# Patient Record
Sex: FEMALE | Race: WHITE | NOT HISPANIC OR LATINO | Employment: OTHER | URBAN - METROPOLITAN AREA
[De-identification: names, ages, dates, MRNs, and addresses within clinical notes are randomized per-mention and may not be internally consistent; named-entity substitution may affect disease eponyms.]

---

## 2024-11-08 ENCOUNTER — APPOINTMENT (OUTPATIENT)
Dept: RADIOLOGY | Facility: HOSPITAL | Age: 79
DRG: 522 | End: 2024-11-08
Payer: MEDICARE

## 2024-11-08 ENCOUNTER — HOSPITAL ENCOUNTER (INPATIENT)
Facility: HOSPITAL | Age: 79
End: 2024-11-08
Attending: STUDENT IN AN ORGANIZED HEALTH CARE EDUCATION/TRAINING PROGRAM | Admitting: STUDENT IN AN ORGANIZED HEALTH CARE EDUCATION/TRAINING PROGRAM
Payer: MEDICARE

## 2024-11-08 ENCOUNTER — APPOINTMENT (OUTPATIENT)
Dept: CARDIOLOGY | Facility: HOSPITAL | Age: 79
DRG: 522 | End: 2024-11-08
Payer: MEDICARE

## 2024-11-08 DIAGNOSIS — S72.002A LEFT DISPLACED FEMORAL NECK FRACTURE: ICD-10-CM

## 2024-11-08 DIAGNOSIS — R55 SYNCOPE, UNSPECIFIED SYNCOPE TYPE: Primary | ICD-10-CM
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
## 2024-11-08 DIAGNOSIS — S72.002A CLOSED FRACTURE OF LEFT HIP, INITIAL ENCOUNTER: ICD-10-CM

## 2024-11-08 DIAGNOSIS — Z01.818 ENCOUNTER FOR OTHER PREPROCEDURAL EXAMINATION: ICD-10-CM

## 2024-11-08 LAB
ANION GAP SERPL CALC-SCNC: 12 MMOL/L (ref 10–20)
APTT PPP: 27 SECONDS (ref 27–38)
BASOPHILS # BLD AUTO: 0.03 X10*3/UL (ref 0–0.1)
BASOPHILS NFR BLD AUTO: 0.4 %
BUN SERPL-MCNC: 10 MG/DL (ref 6–23)
CALCIUM SERPL-MCNC: 9.1 MG/DL (ref 8.6–10.3)
CARDIAC TROPONIN I PNL SERPL HS: 4 NG/L (ref 0–13)
CHLORIDE SERPL-SCNC: 103 MMOL/L (ref 98–107)
CO2 SERPL-SCNC: 27 MMOL/L (ref 21–32)
CREAT SERPL-MCNC: 0.88 MG/DL (ref 0.5–1.05)
EGFRCR SERPLBLD CKD-EPI 2021: 67 ML/MIN/1.73M*2
EOSINOPHIL # BLD AUTO: 0.09 X10*3/UL (ref 0–0.4)
EOSINOPHIL NFR BLD AUTO: 1.1 %
ERYTHROCYTE [DISTWIDTH] IN BLOOD BY AUTOMATED COUNT: 12.6 % (ref 11.5–14.5)
GLUCOSE SERPL-MCNC: 194 MG/DL (ref 74–99)
HCT VFR BLD AUTO: 43.1 % (ref 36–46)
HGB BLD-MCNC: 14.3 G/DL (ref 12–16)
IMM GRANULOCYTES # BLD AUTO: 0.07 X10*3/UL (ref 0–0.5)
IMM GRANULOCYTES NFR BLD AUTO: 0.8 % (ref 0–0.9)
INR PPP: 1.1 (ref 0.9–1.1)
LYMPHOCYTES # BLD AUTO: 0.95 X10*3/UL (ref 0.8–3)
LYMPHOCYTES NFR BLD AUTO: 11.2 %
MCH RBC QN AUTO: 30 PG (ref 26–34)
MCHC RBC AUTO-ENTMCNC: 33.2 G/DL (ref 32–36)
MCV RBC AUTO: 91 FL (ref 80–100)
MONOCYTES # BLD AUTO: 0.83 X10*3/UL (ref 0.05–0.8)
MONOCYTES NFR BLD AUTO: 9.8 %
NEUTROPHILS # BLD AUTO: 6.53 X10*3/UL (ref 1.6–5.5)
NEUTROPHILS NFR BLD AUTO: 76.7 %
NRBC BLD-RTO: 0 /100 WBCS (ref 0–0)
PLATELET # BLD AUTO: 239 X10*3/UL (ref 150–450)
POTASSIUM SERPL-SCNC: 3.8 MMOL/L (ref 3.5–5.3)
PROTHROMBIN TIME: 12.4 SECONDS (ref 9.8–12.8)
RBC # BLD AUTO: 4.76 X10*6/UL (ref 4–5.2)
SODIUM SERPL-SCNC: 138 MMOL/L (ref 136–145)
WBC # BLD AUTO: 8.5 X10*3/UL (ref 4.4–11.3)

## 2024-11-08 PROCEDURE — 99223 1ST HOSP IP/OBS HIGH 75: CPT | Performed by: NURSE PRACTITIONER

## 2024-11-08 PROCEDURE — 82374 ASSAY BLOOD CARBON DIOXIDE: CPT | Performed by: STUDENT IN AN ORGANIZED HEALTH CARE EDUCATION/TRAINING PROGRAM

## 2024-11-08 PROCEDURE — 2500000004 HC RX 250 GENERAL PHARMACY W/ HCPCS (ALT 636 FOR OP/ED): Performed by: STUDENT IN AN ORGANIZED HEALTH CARE EDUCATION/TRAINING PROGRAM

## 2024-11-08 PROCEDURE — 1200000002 HC GENERAL ROOM WITH TELEMETRY DAILY

## 2024-11-08 PROCEDURE — 96374 THER/PROPH/DIAG INJ IV PUSH: CPT

## 2024-11-08 PROCEDURE — 70450 CT HEAD/BRAIN W/O DYE: CPT

## 2024-11-08 PROCEDURE — 70450 CT HEAD/BRAIN W/O DYE: CPT | Performed by: STUDENT IN AN ORGANIZED HEALTH CARE EDUCATION/TRAINING PROGRAM

## 2024-11-08 PROCEDURE — 76377 3D RENDER W/INTRP POSTPROCES: CPT

## 2024-11-08 PROCEDURE — 80048 BASIC METABOLIC PNL TOTAL CA: CPT | Performed by: STUDENT IN AN ORGANIZED HEALTH CARE EDUCATION/TRAINING PROGRAM

## 2024-11-08 PROCEDURE — 71045 X-RAY EXAM CHEST 1 VIEW: CPT

## 2024-11-08 PROCEDURE — 72125 CT NECK SPINE W/O DYE: CPT | Performed by: STUDENT IN AN ORGANIZED HEALTH CARE EDUCATION/TRAINING PROGRAM

## 2024-11-08 PROCEDURE — 93005 ELECTROCARDIOGRAM TRACING: CPT

## 2024-11-08 PROCEDURE — 72125 CT NECK SPINE W/O DYE: CPT

## 2024-11-08 PROCEDURE — 73700 CT LOWER EXTREMITY W/O DYE: CPT | Mod: LT

## 2024-11-08 PROCEDURE — 71045 X-RAY EXAM CHEST 1 VIEW: CPT | Performed by: RADIOLOGY

## 2024-11-08 PROCEDURE — 36415 COLL VENOUS BLD VENIPUNCTURE: CPT | Performed by: STUDENT IN AN ORGANIZED HEALTH CARE EDUCATION/TRAINING PROGRAM

## 2024-11-08 PROCEDURE — 85610 PROTHROMBIN TIME: CPT | Performed by: STUDENT IN AN ORGANIZED HEALTH CARE EDUCATION/TRAINING PROGRAM

## 2024-11-08 PROCEDURE — 2500000004 HC RX 250 GENERAL PHARMACY W/ HCPCS (ALT 636 FOR OP/ED): Performed by: NURSE PRACTITIONER

## 2024-11-08 PROCEDURE — 73502 X-RAY EXAM HIP UNI 2-3 VIEWS: CPT | Mod: LT

## 2024-11-08 PROCEDURE — 85025 COMPLETE CBC W/AUTO DIFF WBC: CPT | Performed by: STUDENT IN AN ORGANIZED HEALTH CARE EDUCATION/TRAINING PROGRAM

## 2024-11-08 PROCEDURE — 85730 THROMBOPLASTIN TIME PARTIAL: CPT | Performed by: STUDENT IN AN ORGANIZED HEALTH CARE EDUCATION/TRAINING PROGRAM

## 2024-11-08 PROCEDURE — 99285 EMERGENCY DEPT VISIT HI MDM: CPT | Mod: 25

## 2024-11-08 PROCEDURE — 84484 ASSAY OF TROPONIN QUANT: CPT | Performed by: STUDENT IN AN ORGANIZED HEALTH CARE EDUCATION/TRAINING PROGRAM

## 2024-11-08 RX ORDER — OXYCODONE HYDROCHLORIDE 5 MG/1
5 TABLET ORAL EVERY 6 HOURS PRN
Status: ACTIVE | OUTPATIENT
Start: 2024-11-08

## 2024-11-08 RX ORDER — ACETAMINOPHEN 160 MG/5ML
650 SOLUTION ORAL EVERY 4 HOURS PRN
Status: ACTIVE | OUTPATIENT
Start: 2024-11-08

## 2024-11-08 RX ORDER — CYCLOBENZAPRINE HCL 10 MG
5 TABLET ORAL 3 TIMES DAILY PRN
Status: DISPENSED | OUTPATIENT
Start: 2024-11-08

## 2024-11-08 RX ORDER — MORPHINE SULFATE 4 MG/ML
4 INJECTION, SOLUTION INTRAMUSCULAR; INTRAVENOUS ONCE
Status: COMPLETED | OUTPATIENT
Start: 2024-11-08 | End: 2024-11-08

## 2024-11-08 RX ORDER — ACETAMINOPHEN 650 MG/1
650 SUPPOSITORY RECTAL EVERY 4 HOURS PRN
Status: ACTIVE | OUTPATIENT
Start: 2024-11-08

## 2024-11-08 RX ORDER — OXYCODONE HYDROCHLORIDE 5 MG/1
10 TABLET ORAL EVERY 6 HOURS PRN
Status: DISPENSED | OUTPATIENT
Start: 2024-11-08

## 2024-11-08 RX ORDER — MORPHINE SULFATE 2 MG/ML
2 INJECTION, SOLUTION INTRAMUSCULAR; INTRAVENOUS EVERY 4 HOURS PRN
Status: DISPENSED | OUTPATIENT
Start: 2024-11-08

## 2024-11-08 RX ORDER — PANTOPRAZOLE SODIUM 40 MG/10ML
40 INJECTION, POWDER, LYOPHILIZED, FOR SOLUTION INTRAVENOUS DAILY
Status: DISPENSED | OUTPATIENT
Start: 2024-11-09

## 2024-11-08 RX ORDER — ONDANSETRON HYDROCHLORIDE 2 MG/ML
4 INJECTION, SOLUTION INTRAVENOUS EVERY 8 HOURS PRN
Status: ACTIVE | OUTPATIENT
Start: 2024-11-08

## 2024-11-08 RX ORDER — DOCUSATE SODIUM 100 MG/1
100 CAPSULE, LIQUID FILLED ORAL 2 TIMES DAILY
Status: DISPENSED | OUTPATIENT
Start: 2024-11-08

## 2024-11-08 RX ORDER — ONDANSETRON 4 MG/1
4 TABLET, FILM COATED ORAL EVERY 8 HOURS PRN
Status: ACTIVE | OUTPATIENT
Start: 2024-11-08

## 2024-11-08 RX ORDER — TALC
3 POWDER (GRAM) TOPICAL NIGHTLY PRN
Status: ACTIVE | OUTPATIENT
Start: 2024-11-08

## 2024-11-08 RX ORDER — PANTOPRAZOLE SODIUM 40 MG/1
40 TABLET, DELAYED RELEASE ORAL DAILY
Status: DISPENSED | OUTPATIENT
Start: 2024-11-09

## 2024-11-08 RX ORDER — ACETAMINOPHEN 325 MG/1
650 TABLET ORAL EVERY 4 HOURS PRN
Status: ACTIVE | OUTPATIENT
Start: 2024-11-08

## 2024-11-08 ASSESSMENT — PAIN - FUNCTIONAL ASSESSMENT
PAIN_FUNCTIONAL_ASSESSMENT: 0-10
PAIN_FUNCTIONAL_ASSESSMENT: 0-10

## 2024-11-08 ASSESSMENT — LIFESTYLE VARIABLES
EVER HAD A DRINK FIRST THING IN THE MORNING TO STEADY YOUR NERVES TO GET RID OF A HANGOVER: NO
HAVE YOU EVER FELT YOU SHOULD CUT DOWN ON YOUR DRINKING: NO
TOTAL SCORE: 0
HAVE PEOPLE ANNOYED YOU BY CRITICIZING YOUR DRINKING: NO
EVER FELT BAD OR GUILTY ABOUT YOUR DRINKING: NO

## 2024-11-08 ASSESSMENT — PAIN DESCRIPTION - PAIN TYPE: TYPE: ACUTE PAIN

## 2024-11-08 ASSESSMENT — PAIN SCALES - GENERAL
PAINLEVEL_OUTOF10: 10 - WORST POSSIBLE PAIN
PAINLEVEL_OUTOF10: 10 - WORST POSSIBLE PAIN

## 2024-11-08 ASSESSMENT — PAIN DESCRIPTION - ORIENTATION: ORIENTATION: LEFT

## 2024-11-08 ASSESSMENT — PAIN DESCRIPTION - LOCATION: LOCATION: HIP

## 2024-11-08 NOTE — ED PROVIDER NOTES
HPI   Chief Complaint   Patient presents with    Fall     PT fell at [facility]. Did not head. No LOC no thinners. L hip painful, left leg shortened and rotated.         History provided by:  Patient  79-year-old female presenting for evaluation of left hip pain after fall.  Per EMS report, patient was at [facility] and had a syncopal event causing her to fall back.  No reported head trauma.  No blood thinners.  Patient had severe left hip pain with shortening and rotation.        Patient History   No past medical history on file.  No past surgical history on file.  No family history on file.  Social History     Tobacco Use    Smoking status: Not on file    Smokeless tobacco: Not on file   Substance Use Topics    Alcohol use: Not on file    Drug use: Not on file       Physical Exam   ED Triage Vitals [11/08/24 1731]   Temperature Heart Rate Respirations BP   35.7 °C (96.3 °F) 74 16 (!) 182/78      Pulse Ox Temp src Heart Rate Source Patient Position   99 % -- -- --      BP Location FiO2 (%)     -- --       Physical Exam  Vitals and nursing note reviewed.   Constitutional:       General: She is not in acute distress.  HENT:      Head: Atraumatic.      Mouth/Throat:      Mouth: Mucous membranes are moist.      Pharynx: Oropharynx is clear.   Eyes:      Conjunctiva/sclera: Conjunctivae normal.      Pupils: Pupils are equal, round, and reactive to light.   Cardiovascular:      Rate and Rhythm: Normal rate and regular rhythm.      Pulses: Normal pulses.   Pulmonary:      Effort: Pulmonary effort is normal. No respiratory distress.      Breath sounds: Normal breath sounds.   Abdominal:      General: There is no distension.      Palpations: Abdomen is soft.      Tenderness: There is no abdominal tenderness. There is no guarding or rebound.   Musculoskeletal:      Cervical back: Neck supple. No tenderness.      Comments: Left lower extremity slightly shortened and externally rotated.  Tenderness to palpation left lateral  hip.  Significant pain with any movement of the left hip.  Neurovascular intact.   Skin:     General: Skin is warm and dry.   Neurological:      Mental Status: She is alert and oriented to person, place, and time. Mental status is at baseline.      Cranial Nerves: No cranial nerve deficit.      Sensory: No sensory deficit.      Motor: No weakness.   Psychiatric:         Mood and Affect: Mood normal.         Behavior: Behavior normal.           ED Course & MDM   Diagnoses as of 11/08/24 2013   Syncope, unspecified syncope type   Fall, initial encounter   Left displaced femoral neck fracture                 No data recorded     Junior Coma Scale Score: 15 (11/08/24 1738 : Juancarlos Fuller RN)                   Labs Reviewed   CBC WITH AUTO DIFFERENTIAL - Abnormal       Result Value    WBC 8.5      nRBC 0.0      RBC 4.76      Hemoglobin 14.3      Hematocrit 43.1      MCV 91      MCH 30.0      MCHC 33.2      RDW 12.6      Platelets 239      Neutrophils % 76.7      Immature Granulocytes %, Automated 0.8      Lymphocytes % 11.2      Monocytes % 9.8      Eosinophils % 1.1      Basophils % 0.4      Neutrophils Absolute 6.53 (*)     Immature Granulocytes Absolute, Automated 0.07      Lymphocytes Absolute 0.95      Monocytes Absolute 0.83 (*)     Eosinophils Absolute 0.09      Basophils Absolute 0.03     BASIC METABOLIC PANEL - Abnormal    Glucose 194 (*)     Sodium 138      Potassium 3.8      Chloride 103      Bicarbonate 27      Anion Gap 12      Urea Nitrogen 10      Creatinine 0.88      eGFR 67      Calcium 9.1     PROTIME-INR - Normal    Protime 12.4      INR 1.1     APTT - Normal    aPTT 27      Narrative:     The APTT is no longer used for monitoring Unfractionated Heparin Therapy. For monitoring Heparin Therapy, use the Heparin Assay.   TROPONIN I, HIGH SENSITIVITY - Normal    Troponin I, High Sensitivity 4      Narrative:     Less than 99th percentile of normal range cutoff-  Female and children under 18 years old <14  ng/L; Male <21 ng/L: Negative  Repeat testing should be performed if clinically indicated.     Female and children under 18 years old 14-50 ng/L; Male 21-50 ng/L:  Consistent with possible cardiac damage and possible increased clinical   risk. Serial measurements may help to assess extent of myocardial damage.     >50 ng/L: Consistent with cardiac damage, increased clinical risk and  myocardial infarction. Serial measurements may help assess extent of   myocardial damage.      NOTE: Children less than 1 year old may have higher baseline troponin   levels and results should be interpreted in conjunction with the overall   clinical context.     NOTE: Troponin I testing is performed using a different   testing methodology at [facility] than at other   [facility]. Direct result comparisons should only   be made within the same method.     CT hip left wo IV contrast   Final Result   Displaced, comminuted and slightly impacted left subcapital femoral   fracture with anterior angulation of the fracture fragments.        MACRO:   None.        Signed by: Modesto Mak 11/8/2024 7:16 PM   Dictation workstation:   [id number]      CT 3D reconstruction   Final Result   Displaced, comminuted and slightly impacted left subcapital femoral   fracture with anterior angulation of the fracture fragments.        MACRO:   None.        Signed by: Modesto Mak 11/8/2024 7:16 PM   Dictation workstation:   [id number]      CT head wo IV contrast   Final Result   CT HEAD:        No evidence of hemorrhage, skull fracture, or other acute   intracranial trauma/abnormality.        CT C-SPINE:        No evidence of acute trauma to the cervical spine.        MACRO:   None        Signed by: Modesto Mak 11/8/2024 7:12 PM   Dictation workstation:   [id number]      CT cervical spine wo IV contrast   Final Result   CT HEAD:        No evidence of hemorrhage, skull fracture, or other acute   intracranial  trauma/abnormality.        CT C-SPINE:        No evidence of acute trauma to the cervical spine.        MACRO:   None        Signed by: Modesto Mak 11/8/2024 7:12 PM   Dictation workstation:   [id number]      XR hip left with pelvis when performed 2 or 3 views   Final Result   Addendum (preliminary) 1 of 1   Interpreted By:  Lucy Wilburn,    ADDENDUM:   Study:   Pelvis one view   Left hip, two views.        Signed by: Lucy Wilburn 11/8/2024 7:38 PM        -------- ORIGINAL REPORT --------   Dictation workstation:   [id number]      Final   1. Subcapital left femoral neck fracture.        MACRO:   None.        Signed by: Lucy Wilburn 11/8/2024 6:44 PM   Dictation workstation:   [id number]      XR chest 1 view   Final Result   1. No acute cardiopulmonary process.        MACRO:   None.        Signed by: Lucy Wilburn 11/8/2024 6:45 PM   Dictation workstation:   [id number]                Medical Decision Making  Amount and/or Complexity of Data Reviewed  ECG/medicine tests: ordered and independent interpretation performed. Decision-making details documented in ED Course.     Details: Twelve-lead ECG obtained at 1800 interpretationnormal sinus rhythm with a rate of 79, no acute ST elevation or depression.  No other acute ischemic changes.  QTc 424    79-year-old female presenting via EMS from [facility] after syncope with fall with concerns for left hip fracture.  Left lower extremity is neurovascularly intact and slightly shortened and externally rotated.  Patient unsure if she hit her head or not.  No blood thinner use.  Plan obtain CT imaging of the head and C-spine to rule out acute traumatic process.  Plain films obtained of the left hip to rule out fracture.  Patient with morphine IV for analgesia.  Basic lab work obtained as well as ECG and troponin given the reported syncope.    Patient's labs reviewed and are overall unremarkable.  Troponin within normal limits.  Normal H&H.  Normal  coags.  No significant electrolyte derangements.  Mild hyperglycemia.  CT head negative for intracranial hemorrhage.  No C-spine fractures on CT imaging of the C-spine.  C-collar was cleared.  Chest x-ray is clear without focal findings.  No pneumothorax.  Plain films of the left hip show left femoral neck fracture.  CT with 3D recons obtained for preoperative planning for orthopedic surgery.  Case discussed with orthopedic surgery on-call, Dr. Porter.  Plan for OR tomorrow.  N.p.o. after midnight.  Case discussed with hospitalist team for admission.    Procedure  Procedures     Bienvenido Powers MD  11/08/24 2013

## 2024-11-09 ENCOUNTER — APPOINTMENT (OUTPATIENT)
Dept: CARDIOLOGY | Facility: HOSPITAL | Age: 79
DRG: 522 | End: 2024-11-09
Payer: MEDICARE

## 2024-11-09 ENCOUNTER — APPOINTMENT (OUTPATIENT)
Dept: RADIOLOGY | Facility: HOSPITAL | Age: 79
DRG: 522 | End: 2024-11-09
Payer: MEDICARE

## 2024-11-09 ENCOUNTER — APPOINTMENT (OUTPATIENT)
Dept: CARDIOLOGY | Facility: HOSPITAL | Age: 79
End: 2024-11-09
Payer: MEDICARE

## 2024-11-09 ENCOUNTER — ANESTHESIA (OUTPATIENT)
Dept: OPERATING ROOM | Facility: HOSPITAL | Age: 79
DRG: 522 | End: 2024-11-09
Payer: MEDICARE

## 2024-11-09 ENCOUNTER — ANESTHESIA EVENT (OUTPATIENT)
Dept: OPERATING ROOM | Facility: HOSPITAL | Age: 79
DRG: 522 | End: 2024-11-09
Payer: MEDICARE

## 2024-11-09 PROBLEM — S72.002A LEFT DISPLACED FEMORAL NECK FRACTURE: Status: ACTIVE | Noted: 2024-11-08

## 2024-11-09 PROBLEM — I10 PRIMARY HYPERTENSION: Status: ACTIVE | Noted: 2024-11-09

## 2024-11-09 LAB
ALBUMIN SERPL BCP-MCNC: 4.1 G/DL (ref 3.4–5)
ALP SERPL-CCNC: 59 U/L (ref 33–136)
ALT SERPL W P-5'-P-CCNC: 9 U/L (ref 7–45)
ANION GAP SERPL CALC-SCNC: 12 MMOL/L (ref 10–20)
AORTIC VALVE MEAN GRADIENT: 4 MMHG
AORTIC VALVE PEAK VELOCITY: 1.34 M/S
AST SERPL W P-5'-P-CCNC: 17 U/L (ref 9–39)
ATRIAL RATE: 79 BPM
AV PEAK GRADIENT: 7 MMHG
AVA (PEAK VEL): 2.16 CM2
AVA (VTI): 2.41 CM2
BASOPHILS # BLD AUTO: 0.02 X10*3/UL (ref 0–0.1)
BASOPHILS NFR BLD AUTO: 0.2 %
BILIRUB SERPL-MCNC: 0.9 MG/DL (ref 0–1.2)
BUN SERPL-MCNC: 9 MG/DL (ref 6–23)
CALCIUM SERPL-MCNC: 8.9 MG/DL (ref 8.6–10.3)
CHLORIDE SERPL-SCNC: 103 MMOL/L (ref 98–107)
CO2 SERPL-SCNC: 26 MMOL/L (ref 21–32)
CREAT SERPL-MCNC: 0.62 MG/DL (ref 0.5–1.05)
EGFRCR SERPLBLD CKD-EPI 2021: >90 ML/MIN/1.73M*2
EJECTION FRACTION APICAL 4 CHAMBER: 61.4
EJECTION FRACTION: 60 %
EOSINOPHIL # BLD AUTO: 0.02 X10*3/UL (ref 0–0.4)
EOSINOPHIL NFR BLD AUTO: 0.2 %
ERYTHROCYTE [DISTWIDTH] IN BLOOD BY AUTOMATED COUNT: 12.7 % (ref 11.5–14.5)
GLUCOSE SERPL-MCNC: 137 MG/DL (ref 74–99)
HCT VFR BLD AUTO: 42.4 % (ref 36–46)
HGB BLD-MCNC: 14.1 G/DL (ref 12–16)
IMM GRANULOCYTES # BLD AUTO: 0.08 X10*3/UL (ref 0–0.5)
IMM GRANULOCYTES NFR BLD AUTO: 0.6 % (ref 0–0.9)
LACTATE SERPL-SCNC: 3.4 MMOL/L (ref 0.4–2)
LACTATE SERPL-SCNC: 5.3 MMOL/L (ref 0.4–2)
LEFT ATRIUM VOLUME AREA LENGTH INDEX BSA: 14.1 ML/M2
LEFT VENTRICLE INTERNAL DIMENSION DIASTOLE: 3.2 CM (ref 3.5–6)
LEFT VENTRICULAR OUTFLOW TRACT DIAMETER: 1.9 CM
LV EJECTION FRACTION BIPLANE: 58 %
LYMPHOCYTES # BLD AUTO: 1 X10*3/UL (ref 0.8–3)
LYMPHOCYTES NFR BLD AUTO: 7.9 %
MAGNESIUM SERPL-MCNC: 2.08 MG/DL (ref 1.6–2.4)
MCH RBC QN AUTO: 29.8 PG (ref 26–34)
MCHC RBC AUTO-ENTMCNC: 33.3 G/DL (ref 32–36)
MCV RBC AUTO: 90 FL (ref 80–100)
MITRAL VALVE E/A RATIO: 0.79
MONOCYTES # BLD AUTO: 1.29 X10*3/UL (ref 0.05–0.8)
MONOCYTES NFR BLD AUTO: 10.2 %
NEUTROPHILS # BLD AUTO: 10.26 X10*3/UL (ref 1.6–5.5)
NEUTROPHILS NFR BLD AUTO: 80.9 %
NRBC BLD-RTO: 0 /100 WBCS (ref 0–0)
P AXIS: 65 DEGREES
P OFFSET: 196 MS
P ONSET: 148 MS
PLATELET # BLD AUTO: 242 X10*3/UL (ref 150–450)
POTASSIUM SERPL-SCNC: 4 MMOL/L (ref 3.5–5.3)
PR INTERVAL: 136 MS
PROT SERPL-MCNC: 6.9 G/DL (ref 6.4–8.2)
Q ONSET: 216 MS
QRS COUNT: 13 BEATS
QRS DURATION: 68 MS
QT INTERVAL: 370 MS
QTC CALCULATION(BAZETT): 424 MS
QTC FREDERICIA: 405 MS
R AXIS: 51 DEGREES
RBC # BLD AUTO: 4.73 X10*6/UL (ref 4–5.2)
RIGHT VENTRICLE FREE WALL PEAK S': 13.1 CM/S
RIGHT VENTRICLE PEAK SYSTOLIC PRESSURE: 38.8 MMHG
SODIUM SERPL-SCNC: 137 MMOL/L (ref 136–145)
T AXIS: 56 DEGREES
T OFFSET: 401 MS
TRICUSPID ANNULAR PLANE SYSTOLIC EXCURSION: 1.6 CM
TSH SERPL-ACNC: 0.73 MIU/L (ref 0.44–3.98)
VENTRICULAR RATE: 79 BPM
WBC # BLD AUTO: 12.7 X10*3/UL (ref 4.4–11.3)

## 2024-11-09 PROCEDURE — 83735 ASSAY OF MAGNESIUM: CPT | Performed by: NURSE PRACTITIONER

## 2024-11-09 PROCEDURE — 99221 1ST HOSP IP/OBS SF/LOW 40: CPT | Performed by: ORTHOPAEDIC SURGERY

## 2024-11-09 PROCEDURE — 2720000007 HC OR 272 NO HCPCS: Performed by: ORTHOPAEDIC SURGERY

## 2024-11-09 PROCEDURE — 2500000004 HC RX 250 GENERAL PHARMACY W/ HCPCS (ALT 636 FOR OP/ED): Performed by: NURSE PRACTITIONER

## 2024-11-09 PROCEDURE — 2500000001 HC RX 250 WO HCPCS SELF ADMINISTERED DRUGS (ALT 637 FOR MEDICARE OP): Performed by: ORTHOPAEDIC SURGERY

## 2024-11-09 PROCEDURE — 83605 ASSAY OF LACTIC ACID: CPT | Performed by: STUDENT IN AN ORGANIZED HEALTH CARE EDUCATION/TRAINING PROGRAM

## 2024-11-09 PROCEDURE — 27236 TREAT THIGH FRACTURE: CPT | Performed by: PHYSICIAN ASSISTANT

## 2024-11-09 PROCEDURE — 0SRS0J9 REPLACEMENT OF LEFT HIP JOINT, FEMORAL SURFACE WITH SYNTHETIC SUBSTITUTE, CEMENTED, OPEN APPROACH: ICD-10-PCS | Performed by: ORTHOPAEDIC SURGERY

## 2024-11-09 PROCEDURE — C1776 JOINT DEVICE (IMPLANTABLE): HCPCS | Performed by: ORTHOPAEDIC SURGERY

## 2024-11-09 PROCEDURE — 36415 COLL VENOUS BLD VENIPUNCTURE: CPT | Performed by: STUDENT IN AN ORGANIZED HEALTH CARE EDUCATION/TRAINING PROGRAM

## 2024-11-09 PROCEDURE — 3700000001 HC GENERAL ANESTHESIA TIME - INITIAL BASE CHARGE: Performed by: ORTHOPAEDIC SURGERY

## 2024-11-09 PROCEDURE — 2500000004 HC RX 250 GENERAL PHARMACY W/ HCPCS (ALT 636 FOR OP/ED)

## 2024-11-09 PROCEDURE — 73501 X-RAY EXAM HIP UNI 1 VIEW: CPT | Mod: LEFT SIDE | Performed by: RADIOLOGY

## 2024-11-09 PROCEDURE — 99232 SBSQ HOSP IP/OBS MODERATE 35: CPT | Performed by: STUDENT IN AN ORGANIZED HEALTH CARE EDUCATION/TRAINING PROGRAM

## 2024-11-09 PROCEDURE — 2780000003 HC OR 278 NO HCPCS: Performed by: ORTHOPAEDIC SURGERY

## 2024-11-09 PROCEDURE — 2500000002 HC RX 250 W HCPCS SELF ADMINISTERED DRUGS (ALT 637 FOR MEDICARE OP, ALT 636 FOR OP/ED)

## 2024-11-09 PROCEDURE — 3600000005 HC OR TIME - INITIAL BASE CHARGE - PROCEDURE LEVEL FIVE: Performed by: ORTHOPAEDIC SURGERY

## 2024-11-09 PROCEDURE — A6213 FOAM DRG >16<=48 SQ IN W/BDR: HCPCS | Performed by: ORTHOPAEDIC SURGERY

## 2024-11-09 PROCEDURE — 2500000005 HC RX 250 GENERAL PHARMACY W/O HCPCS: Performed by: ANESTHESIOLOGY

## 2024-11-09 PROCEDURE — 2500000001 HC RX 250 WO HCPCS SELF ADMINISTERED DRUGS (ALT 637 FOR MEDICARE OP): Performed by: ANESTHESIOLOGY

## 2024-11-09 PROCEDURE — 2500000004 HC RX 250 GENERAL PHARMACY W/ HCPCS (ALT 636 FOR OP/ED): Performed by: ORTHOPAEDIC SURGERY

## 2024-11-09 PROCEDURE — 85025 COMPLETE CBC W/AUTO DIFF WBC: CPT | Performed by: NURSE PRACTITIONER

## 2024-11-09 PROCEDURE — 84443 ASSAY THYROID STIM HORMONE: CPT | Performed by: STUDENT IN AN ORGANIZED HEALTH CARE EDUCATION/TRAINING PROGRAM

## 2024-11-09 PROCEDURE — 93880 EXTRACRANIAL BILAT STUDY: CPT

## 2024-11-09 PROCEDURE — 1200000002 HC GENERAL ROOM WITH TELEMETRY DAILY

## 2024-11-09 PROCEDURE — 3600000010 HC OR TIME - EACH INCREMENTAL 1 MINUTE - PROCEDURE LEVEL FIVE: Performed by: ORTHOPAEDIC SURGERY

## 2024-11-09 PROCEDURE — 73501 X-RAY EXAM HIP UNI 1 VIEW: CPT | Mod: LT

## 2024-11-09 PROCEDURE — 99223 1ST HOSP IP/OBS HIGH 75: CPT | Performed by: INTERNAL MEDICINE

## 2024-11-09 PROCEDURE — 3700000002 HC GENERAL ANESTHESIA TIME - EACH INCREMENTAL 1 MINUTE: Performed by: ORTHOPAEDIC SURGERY

## 2024-11-09 PROCEDURE — 93880 EXTRACRANIAL BILAT STUDY: CPT | Performed by: RADIOLOGY

## 2024-11-09 PROCEDURE — 27236 TREAT THIGH FRACTURE: CPT | Performed by: ORTHOPAEDIC SURGERY

## 2024-11-09 PROCEDURE — 36415 COLL VENOUS BLD VENIPUNCTURE: CPT | Performed by: NURSE PRACTITIONER

## 2024-11-09 PROCEDURE — 7100000001 HC RECOVERY ROOM TIME - INITIAL BASE CHARGE: Performed by: ORTHOPAEDIC SURGERY

## 2024-11-09 PROCEDURE — 93306 TTE W/DOPPLER COMPLETE: CPT

## 2024-11-09 PROCEDURE — 80053 COMPREHEN METABOLIC PANEL: CPT | Performed by: NURSE PRACTITIONER

## 2024-11-09 PROCEDURE — 93306 TTE W/DOPPLER COMPLETE: CPT | Performed by: INTERNAL MEDICINE

## 2024-11-09 PROCEDURE — 2500000004 HC RX 250 GENERAL PHARMACY W/ HCPCS (ALT 636 FOR OP/ED): Mod: JZ

## 2024-11-09 PROCEDURE — 2500000004 HC RX 250 GENERAL PHARMACY W/ HCPCS (ALT 636 FOR OP/ED): Performed by: ANESTHESIOLOGY

## 2024-11-09 PROCEDURE — 2500000005 HC RX 250 GENERAL PHARMACY W/O HCPCS: Performed by: ORTHOPAEDIC SURGERY

## 2024-11-09 PROCEDURE — 7100000002 HC RECOVERY ROOM TIME - EACH INCREMENTAL 1 MINUTE: Performed by: ORTHOPAEDIC SURGERY

## 2024-11-09 DEVICE — HEAD COMPONENT
Type: IMPLANTABLE DEVICE | Site: HIP | Status: FUNCTIONAL
Brand: UNITRAX

## 2024-11-09 DEVICE — DISTAL SPACER
Type: IMPLANTABLE DEVICE | Site: HIP | Status: FUNCTIONAL
Brand: ACCOLADE

## 2024-11-09 DEVICE — SIMPLEX® HV IS A FAST-SETTING ACRYLIC RESIN FOR USE IN BONE SURGERY. MIXING THE TWO SEPARATE STERILE COMPONENTS PRODUCES A DUCTILE BONE CEMENT WHICH, AFTER HARDENING, FIXES THE IMPLANT AND TRANSFERS STRESSES PRODUCED DURING MOVEMENT EVENLY TO THE BONE. SIMPLEX® HV CEMENT POWDER ALSO CONTAINS INSOLUBLE ZIRCONIUM DIOXIDE AS AN X-RAY CONTRAST MEDIUM. SIMPLEX® HV DOES NOT EMIT A SIGNAL AND DOES NOT POSE A SAFETY RISK IN A MAGNETIC RESONANCE ENVIRONMENT.
Type: IMPLANTABLE DEVICE | Site: HIP | Status: FUNCTIONAL
Brand: SIMPLEX HV

## 2024-11-09 DEVICE — NECK ADJUSTMENT SLEEVE
Type: IMPLANTABLE DEVICE | Site: HIP | Status: FUNCTIONAL
Brand: UNITRAX

## 2024-11-09 DEVICE — 132 DEGREE CEMENTED HIP STEM
Type: IMPLANTABLE DEVICE | Site: HIP | Status: FUNCTIONAL
Brand: ACCOLADE

## 2024-11-09 RX ORDER — PHENYLEPHRINE HCL IN 0.9% NACL 1 MG/10 ML
SYRINGE (ML) INTRAVENOUS AS NEEDED
Status: DISCONTINUED | OUTPATIENT
Start: 2024-11-09 | End: 2024-11-09

## 2024-11-09 RX ORDER — LABETALOL HYDROCHLORIDE 5 MG/ML
INJECTION, SOLUTION INTRAVENOUS AS NEEDED
Status: DISCONTINUED | OUTPATIENT
Start: 2024-11-09 | End: 2024-11-09

## 2024-11-09 RX ORDER — LISINOPRIL 10 MG/1
1 TABLET ORAL
Status: ON HOLD | COMMUNITY
Start: 2024-05-30

## 2024-11-09 RX ORDER — ENOXAPARIN SODIUM 100 MG/ML
40 INJECTION SUBCUTANEOUS DAILY
Status: DISPENSED | OUTPATIENT
Start: 2024-11-10 | End: 2024-12-08

## 2024-11-09 RX ORDER — WATER 1 ML/ML
IRRIGANT IRRIGATION AS NEEDED
Status: DISCONTINUED | OUTPATIENT
Start: 2024-11-09 | End: 2024-11-09 | Stop reason: HOSPADM

## 2024-11-09 RX ORDER — LABETALOL HYDROCHLORIDE 5 MG/ML
5 INJECTION, SOLUTION INTRAVENOUS
Status: DISPENSED | OUTPATIENT
Start: 2024-11-09 | End: 2024-11-09

## 2024-11-09 RX ORDER — MEPERIDINE HYDROCHLORIDE 50 MG/ML
12.5 INJECTION INTRAMUSCULAR; INTRAVENOUS; SUBCUTANEOUS EVERY 10 MIN PRN
Status: ACTIVE | OUTPATIENT
Start: 2024-11-09

## 2024-11-09 RX ORDER — TRANEXAMIC ACID 650 MG/1
1950 TABLET ORAL ONCE
Status: COMPLETED | OUTPATIENT
Start: 2024-11-09 | End: 2024-11-09

## 2024-11-09 RX ORDER — ROPIVACAINE/EPI/CLONIDINE/KET 2.46-0.005
SYRINGE (ML) INJECTION AS NEEDED
Status: DISCONTINUED | OUTPATIENT
Start: 2024-11-09 | End: 2024-11-09 | Stop reason: HOSPADM

## 2024-11-09 RX ORDER — FENTANYL CITRATE 50 UG/ML
25 INJECTION, SOLUTION INTRAMUSCULAR; INTRAVENOUS EVERY 5 MIN PRN
Status: ACTIVE | OUTPATIENT
Start: 2024-11-09

## 2024-11-09 RX ORDER — CEFAZOLIN SODIUM 2 G/100ML
2 INJECTION, SOLUTION INTRAVENOUS EVERY 8 HOURS
Status: COMPLETED | OUTPATIENT
Start: 2024-11-09 | End: 2024-11-10

## 2024-11-09 RX ORDER — SODIUM CHLORIDE, SODIUM LACTATE, POTASSIUM CHLORIDE, CALCIUM CHLORIDE 600; 310; 30; 20 MG/100ML; MG/100ML; MG/100ML; MG/100ML
INJECTION, SOLUTION INTRAVENOUS CONTINUOUS PRN
Status: DISCONTINUED | OUTPATIENT
Start: 2024-11-09 | End: 2024-11-09

## 2024-11-09 RX ORDER — CEFAZOLIN SODIUM 2 G/50ML
2 SOLUTION INTRAVENOUS ONCE
Status: COMPLETED | OUTPATIENT
Start: 2024-11-09 | End: 2024-11-09

## 2024-11-09 RX ORDER — SUCCINYLCHOLINE CHLORIDE 100 MG/5ML
SYRINGE (ML) INTRAVENOUS AS NEEDED
Status: DISCONTINUED | OUTPATIENT
Start: 2024-11-09 | End: 2024-11-09

## 2024-11-09 RX ORDER — ENOXAPARIN SODIUM 100 MG/ML
40 INJECTION SUBCUTANEOUS
Status: DISCONTINUED | OUTPATIENT
Start: 2024-11-09 | End: 2024-11-09

## 2024-11-09 RX ORDER — HYDRALAZINE HYDROCHLORIDE 20 MG/ML
10 INJECTION INTRAMUSCULAR; INTRAVENOUS EVERY 6 HOURS PRN
Status: DISPENSED | OUTPATIENT
Start: 2024-11-09

## 2024-11-09 RX ORDER — PROPOFOL 10 MG/ML
INJECTION, EMULSION INTRAVENOUS AS NEEDED
Status: DISCONTINUED | OUTPATIENT
Start: 2024-11-09 | End: 2024-11-09

## 2024-11-09 RX ORDER — FENTANYL CITRATE 50 UG/ML
50 INJECTION, SOLUTION INTRAMUSCULAR; INTRAVENOUS EVERY 5 MIN PRN
Status: ACTIVE | OUTPATIENT
Start: 2024-11-09

## 2024-11-09 RX ORDER — ROCURONIUM BROMIDE 10 MG/ML
INJECTION, SOLUTION INTRAVENOUS AS NEEDED
Status: DISCONTINUED | OUTPATIENT
Start: 2024-11-09 | End: 2024-11-09

## 2024-11-09 RX ORDER — CEFAZOLIN SODIUM 2 G/100ML
2 INJECTION, SOLUTION INTRAVENOUS ONCE
Status: DISCONTINUED | OUTPATIENT
Start: 2024-11-09 | End: 2024-11-09

## 2024-11-09 RX ORDER — FENTANYL CITRATE 50 UG/ML
INJECTION, SOLUTION INTRAMUSCULAR; INTRAVENOUS AS NEEDED
Status: DISCONTINUED | OUTPATIENT
Start: 2024-11-09 | End: 2024-11-09

## 2024-11-09 RX ORDER — TRANEXAMIC ACID 650 MG/1
1950 TABLET ORAL ONCE
Status: COMPLETED | OUTPATIENT
Start: 2024-11-10 | End: 2024-11-10

## 2024-11-09 RX ORDER — OXYCODONE HYDROCHLORIDE 5 MG/1
5 TABLET ORAL ONCE
Status: COMPLETED | OUTPATIENT
Start: 2024-11-09 | End: 2024-11-09

## 2024-11-09 RX ORDER — SODIUM CHLORIDE, SODIUM LACTATE, POTASSIUM CHLORIDE, CALCIUM CHLORIDE 600; 310; 30; 20 MG/100ML; MG/100ML; MG/100ML; MG/100ML
100 INJECTION, SOLUTION INTRAVENOUS CONTINUOUS
Status: ACTIVE | OUTPATIENT
Start: 2024-11-09 | End: 2024-11-10

## 2024-11-09 RX ORDER — LISINOPRIL 10 MG/1
10 TABLET ORAL
Status: DISCONTINUED | OUTPATIENT
Start: 2024-11-09 | End: 2024-11-09

## 2024-11-09 SDOH — ECONOMIC STABILITY: FOOD INSECURITY: HOW HARD IS IT FOR YOU TO PAY FOR THE VERY BASICS LIKE FOOD, HOUSING, MEDICAL CARE, AND HEATING?: PATIENT DECLINED

## 2024-11-09 SDOH — SOCIAL STABILITY: SOCIAL INSECURITY: DO YOU FEEL ANYONE HAS EXPLOITED OR TAKEN ADVANTAGE OF YOU FINANCIALLY OR OF YOUR PERSONAL PROPERTY?: NO

## 2024-11-09 SDOH — SOCIAL STABILITY: SOCIAL INSECURITY: HAVE YOU HAD THOUGHTS OF HARMING ANYONE ELSE?: NO

## 2024-11-09 SDOH — SOCIAL STABILITY: SOCIAL INSECURITY
WITHIN THE LAST YEAR, HAVE YOU BEEN HUMILIATED OR EMOTIONALLY ABUSED IN OTHER WAYS BY YOUR PARTNER OR EX-PARTNER?: PATIENT DECLINED

## 2024-11-09 SDOH — SOCIAL STABILITY: SOCIAL INSECURITY: DO YOU FEEL UNSAFE GOING BACK TO THE PLACE WHERE YOU ARE LIVING?: NO

## 2024-11-09 SDOH — ECONOMIC STABILITY: HOUSING INSECURITY: IN THE PAST 12 MONTHS, HOW MANY TIMES HAVE YOU MOVED WHERE YOU WERE LIVING?: 0

## 2024-11-09 SDOH — SOCIAL STABILITY: SOCIAL INSECURITY
WITHIN THE LAST YEAR, HAVE YOU BEEN KICKED, HIT, SLAPPED, OR OTHERWISE PHYSICALLY HURT BY YOUR PARTNER OR EX-PARTNER?: PATIENT DECLINED

## 2024-11-09 SDOH — ECONOMIC STABILITY: FOOD INSECURITY
WITHIN THE PAST 12 MONTHS, YOU WORRIED THAT YOUR FOOD WOULD RUN OUT BEFORE YOU GOT THE MONEY TO BUY MORE.: PATIENT DECLINED

## 2024-11-09 SDOH — ECONOMIC STABILITY: INCOME INSECURITY
IN THE PAST 12 MONTHS HAS THE ELECTRIC, GAS, OIL, OR WATER COMPANY THREATENED TO SHUT OFF SERVICES IN YOUR HOME?: PATIENT DECLINED

## 2024-11-09 SDOH — SOCIAL STABILITY: SOCIAL INSECURITY: ABUSE: ADULT

## 2024-11-09 SDOH — ECONOMIC STABILITY: TRANSPORTATION INSECURITY
IN THE PAST 12 MONTHS, HAS LACK OF TRANSPORTATION KEPT YOU FROM MEDICAL APPOINTMENTS OR FROM GETTING MEDICATIONS?: PATIENT DECLINED

## 2024-11-09 SDOH — SOCIAL STABILITY: SOCIAL INSECURITY: HAS ANYONE EVER THREATENED TO HURT YOUR FAMILY OR YOUR PETS?: NO

## 2024-11-09 SDOH — ECONOMIC STABILITY: HOUSING INSECURITY: AT ANY TIME IN THE PAST 12 MONTHS, WERE YOU HOMELESS OR LIVING IN A SHELTER (INCLUDING NOW)?: PATIENT DECLINED

## 2024-11-09 SDOH — ECONOMIC STABILITY: FOOD INSECURITY: WITHIN THE PAST 12 MONTHS, THE FOOD YOU BOUGHT JUST DIDN'T LAST AND YOU DIDN'T HAVE MONEY TO GET MORE.: PATIENT DECLINED

## 2024-11-09 SDOH — ECONOMIC STABILITY: HOUSING INSECURITY: IN THE LAST 12 MONTHS, WAS THERE A TIME WHEN YOU WERE NOT ABLE TO PAY THE MORTGAGE OR RENT ON TIME?: PATIENT DECLINED

## 2024-11-09 SDOH — SOCIAL STABILITY: SOCIAL INSECURITY: WERE YOU ABLE TO COMPLETE ALL THE BEHAVIORAL HEALTH SCREENINGS?: YES

## 2024-11-09 SDOH — SOCIAL STABILITY: SOCIAL INSECURITY
WITHIN THE LAST YEAR, HAVE YOU BEEN RAPED OR FORCED TO HAVE ANY KIND OF SEXUAL ACTIVITY BY YOUR PARTNER OR EX-PARTNER?: PATIENT DECLINED

## 2024-11-09 SDOH — SOCIAL STABILITY: SOCIAL INSECURITY: ARE YOU OR HAVE YOU BEEN THREATENED OR ABUSED PHYSICALLY, EMOTIONALLY, OR SEXUALLY BY ANYONE?: NO

## 2024-11-09 SDOH — SOCIAL STABILITY: SOCIAL INSECURITY: WITHIN THE LAST YEAR, HAVE YOU BEEN AFRAID OF YOUR PARTNER OR EX-PARTNER?: PATIENT DECLINED

## 2024-11-09 SDOH — SOCIAL STABILITY: SOCIAL INSECURITY: ARE THERE ANY APPARENT SIGNS OF INJURIES/BEHAVIORS THAT COULD BE RELATED TO ABUSE/NEGLECT?: NO

## 2024-11-09 SDOH — SOCIAL STABILITY: SOCIAL INSECURITY: DOES ANYONE TRY TO KEEP YOU FROM HAVING/CONTACTING OTHER FRIENDS OR DOING THINGS OUTSIDE YOUR HOME?: NO

## 2024-11-09 SDOH — SOCIAL STABILITY: SOCIAL INSECURITY: HAVE YOU HAD ANY THOUGHTS OF HARMING ANYONE ELSE?: NO

## 2024-11-09 ASSESSMENT — PAIN - FUNCTIONAL ASSESSMENT
PAIN_FUNCTIONAL_ASSESSMENT: 0-10
PAIN_FUNCTIONAL_ASSESSMENT: WONG-BAKER FACES
PAIN_FUNCTIONAL_ASSESSMENT: WONG-BAKER FACES
PAIN_FUNCTIONAL_ASSESSMENT: 0-10

## 2024-11-09 ASSESSMENT — PAIN SCALES - GENERAL
PAINLEVEL_OUTOF10: 7
PAINLEVEL_OUTOF10: 5 - MODERATE PAIN
PAINLEVEL_OUTOF10: 5 - MODERATE PAIN
PAINLEVEL_OUTOF10: 0 - NO PAIN
PAINLEVEL_OUTOF10: 5 - MODERATE PAIN
PAINLEVEL_OUTOF10: 5 - MODERATE PAIN
PAINLEVEL_OUTOF10: 0 - NO PAIN
PAINLEVEL_OUTOF10: 5 - MODERATE PAIN

## 2024-11-09 ASSESSMENT — LIFESTYLE VARIABLES
AUDIT-C TOTAL SCORE: 0
AUDIT-C TOTAL SCORE: 0
HOW OFTEN DO YOU HAVE 6 OR MORE DRINKS ON ONE OCCASION: NEVER
HOW MANY STANDARD DRINKS CONTAINING ALCOHOL DO YOU HAVE ON A TYPICAL DAY: PATIENT DOES NOT DRINK
SKIP TO QUESTIONS 9-10: 1
HOW OFTEN DO YOU HAVE A DRINK CONTAINING ALCOHOL: NEVER

## 2024-11-09 ASSESSMENT — COGNITIVE AND FUNCTIONAL STATUS - GENERAL
HELP NEEDED FOR BATHING: A LITTLE
CLIMB 3 TO 5 STEPS WITH RAILING: TOTAL
TOILETING: A LOT
DRESSING REGULAR UPPER BODY CLOTHING: A LITTLE
DRESSING REGULAR LOWER BODY CLOTHING: A LITTLE
PATIENT BASELINE BEDBOUND: NO
MOBILITY SCORE: 19
WALKING IN HOSPITAL ROOM: A LOT
DAILY ACTIVITIY SCORE: 19

## 2024-11-09 ASSESSMENT — ACTIVITIES OF DAILY LIVING (ADL)
FEEDING YOURSELF: INDEPENDENT
WALKS IN HOME: INDEPENDENT
TOILETING: INDEPENDENT
GROOMING: INDEPENDENT
HEARING - RIGHT EAR: UNABLE TO ASSESS
PATIENT'S MEMORY ADEQUATE TO SAFELY COMPLETE DAILY ACTIVITIES?: UNABLE TO ASSESS
HEARING - RIGHT EAR: UNABLE TO ASSESS
TOILETING: INDEPENDENT
HEARING - LEFT EAR: UNABLE TO ASSESS
BATHING: INDEPENDENT
DRESSING YOURSELF: INDEPENDENT
HEARING - LEFT EAR: UNABLE TO ASSESS
BATHING: INDEPENDENT
LACK_OF_TRANSPORTATION: NO
ADEQUATE_TO_COMPLETE_ADL: YES
GROOMING: INDEPENDENT
JUDGMENT_ADEQUATE_SAFELY_COMPLETE_DAILY_ACTIVITIES: UNABLE TO ASSESS
ADEQUATE_TO_COMPLETE_ADL: YES
DRESSING YOURSELF: INDEPENDENT
LACK_OF_TRANSPORTATION: PATIENT DECLINED
PATIENT'S MEMORY ADEQUATE TO SAFELY COMPLETE DAILY ACTIVITIES?: UNABLE TO ASSESS
ADEQUATE_TO_COMPLETE_ADL: YES
JUDGMENT_ADEQUATE_SAFELY_COMPLETE_DAILY_ACTIVITIES: UNABLE TO ASSESS
WALKS IN HOME: INDEPENDENT

## 2024-11-09 ASSESSMENT — PATIENT HEALTH QUESTIONNAIRE - PHQ9
2. FEELING DOWN, DEPRESSED OR HOPELESS: NOT AT ALL
SUM OF ALL RESPONSES TO PHQ9 QUESTIONS 1 & 2: 0
1. LITTLE INTEREST OR PLEASURE IN DOING THINGS: NOT AT ALL

## 2024-11-09 ASSESSMENT — PAIN SCALES - WONG BAKER
WONGBAKER_NUMERICALRESPONSE: HURTS LITTLE MORE
WONGBAKER_NUMERICALRESPONSE: HURTS LITTLE MORE

## 2024-11-09 NOTE — OP NOTE
Hip Hemiarthroplasty (L) Operative Note     Date: 2024 - 2024  OR Location: [facility]    Name: Marita Aquino, : 1945, Age: 79 y.o., MRN: [id number], Sex: female    Diagnosis  Pre-op Diagnosis      * Closed fracture of left hip, initial encounter [S72.002A] Post-op Diagnosis     * Closed fracture of left hip, initial encounter [S72.002A]     Procedures  Hip Hemiarthroplasty  [address] - [place] HEMIARTHROPLASTY HIP PARTIAL      Surgeons      * Finesse Porter - Primary    Resident/Fellow/Other Assistant:  Surgeons and Role:     * Vikas Pablo PA-C - Assisting    Staff:   Circulator: Keeley An Person: Martha    Anesthesia Staff: Anesthesiologist: Celso Land MD    Procedure Summary  Anesthesia: General  ASA: III  Estimated Blood Loss: 100mL  Intra-op Medications:   Administrations occurring from 1030 to 1250 on 24:   Medication Name Total Dose   phenylephrine 100 mcg/mL syringe 10 mL (prefilled) 200 mcg              Anesthesia Record               Intraprocedure I/O Totals          Intake    ceFAZolin (Ancef) 2 g in dextrose (iso) IV 50 mL 50.00 mL    Total Intake 50 mL          Specimen: No specimens collected              Drains and/or Catheters:   External Urinary Catheter (Active)       Tourniquet Times:         Implants:  Implants       Type Name Action Serial No.      Joint SLEEVE, V40 +0MM STD TAPER UNITRAX - [id number] Implanted      Joint SPACER, DISTAL 14MM MED ACCOLADE - [id number] Implanted      Joint HEAD, ENDO 45MM - [id number] Implanted      Joint STEM, FEMUR 132D SZ 4 137MM ACCOLADE C - [id number] Implanted               Findings: see procedure details    Indications: Marita Aquino is an 79 y.o. female who is having surgery for LEFT HIP FRACTURE.     The patient was seen in the preoperative area. The risks, benefits, complications, treatment options, non-operative alternatives, expected recovery and outcomes were discussed with the patient. The possibilities of reaction  "to medication, pulmonary aspiration, injury to surrounding structures, bleeding, recurrent infection, the need for additional procedures, failure to diagnose a condition, and creating a complication requiring transfusion or operation were discussed with the patient. The patient concurred with the proposed plan, giving informed consent.  The site of surgery was properly noted/marked if necessary per policy. The patient has been actively warmed in preoperative area. Preoperative antibiotics have been ordered and given within 1 hours of incision. Venous thrombosis prophylaxis have been ordered.    Procedure Details:   Preoperative diagnosis femoral neck fracture left  Postoperative diagnosis femoral neck fracture left  Procedure hemiarthroplasty left    Primary surgeon Finesse Porter  Assisting Vikas GODWIN certified    Implants Fox  Stem size4 Accolade C neck angle 132  Head size 45 mm\" Endo neck length was 0    Anesthesia General  Blood loss 100      Prior to taking the patient to the operating room surgical site was marked for history and physical was reviewed updated in signed and the patient received appropriate preoperative antibiotics.    The patient was noted to have a displaced femoral neck fracture.  It was recommended that the patient undergo hemiarthroplasty fixation.  The procedure its risks benefits treatment alternatives and postoperative course were discussed with the patient and the family he understood and consented to the procedure.    The physician assistant was present through the entire case.  Given the nature of the disease process and the procedure to be performed skilled surgical first assistant was necessary during the case.  The assistant was necessary to hold retractors and manipulate the extremity during the procedure.  A certified scrub tech was at the back table managing the instruments and supplies for the surgical case.    The patient was placed on the table in the supine " position where upon adequate anesthesia was obtained.  The patient was then placed in the lateral decubitus position being careful to pad all pressure points.  The hip was then prepped and draped in the usual sterile manner.  A surgical timeout was performed prior to starting the procedure.  Standard posterolateral approach to the hip was used and the incision was continued through the skin and subcutaneous tissues down to the fascia.  Bovie electrocautery device was used for hemostasis.  The fascia was split in line with its fibers and anterior and posterior retractors were then inserted.  The joint capsule and piriformis tendon were taken down in 1 layer using the Bovie electrocautery device peeling it from the piriformis without competition.  The capsule was then teed in line with the piriformis tendon.  The hip was then dislocated at the fracture site.  All bony fragments were then removed from within the acetabulum and the femoral head was removed as well.  The femoral head was sized on the back table.  The acetabulum was exposed inspected and no other maladies were noted.  Femoral neck retractor was then inserted and sequential broaching of the femur was performed.  Progressive sizing was performed being careful to maintain 10-15ï¿½ of anteversion on the broach.  Once the final broach was completely seated calcar planing was performed in a trial neck and head were applied.  The hip was then dislocated and placed through range of motion and stability was assessed and felt to be satisfactory.  All trial components were then removed and a cement restrictor was inserted in the femoral canal.  The canal was irrigated with a copious amount of pulsatile irrigation and was completely dried for final femoral preparation.  The cement was mixed under vacuum technique and the back table and inserted with pressurization.  The femoral component was then completely seated and all marginal cement was removed without  difficulty.  Once the cement had completely hardened the final bipolar assembly was applied and the hip was reduced.  Hip was then placed through another range of motion and felt to be stable.  The joint was then irrigated again with copious amount of saline irrigation and the capsule and short external rotators were repaired using #2 Ethibond sutures through drill holes in the piriformis fossa.  The fascia was repaired using #1 Vicryl suture in a running locked fashion.  3-0 Monocryl suture was used for subcutaneous tissues and 4-0 Monocryl suture was used for subcuticular stitch skin glue was used and a dry sterile dressing was applied.    The patient tolerated procedure well and was taken to postanesthesia care unit without consultations.  The findings of the procedure were discussed with the family.  A postoperative x-ray was reviewed as well.    This note was prepared using voice recognition software.  The details of this note are correct and have been reviewed, and corrected to the best of my ability.  Some grammatical areas may persist related to the Dragon software    Finesse Porter MD    [phone]    Complications:  None; patient tolerated the procedure well.    Disposition: PACU - hemodynamically stable.  Condition: stable         Finesse Porter  Phone Number: [phone]

## 2024-11-09 NOTE — CONSULTS
"Reason For Consult  Left-sided hip pain as a result of fall    History Of Present Illness  Marita Aquino is a 79 y.o. female presenting with significant past medical history of compression fracture T4 [2011] otherwise does not appear to be following with any PCP presenting to [facility] s/p fall resulting in left hip fracture.  Patient with uncontrolled pain, shortening and external rotation of LLE, sensation intact able to move all toes.  It is unclear whether patient had true syncopal event resulting in fall as this patient is a poor historian and very hard of hearing.  Head CT negative, chest x-ray negative, she is NOT on anticoagulation, EKG is negative, no complaints of chest pain.  3D CT left hip ordered - results pending for Ortho review, there is no medical history noted of DM2, CKD, HTN, CVA or MI. .     Past Medical History  She has no past medical history on file.    Surgical History  She has no past surgical history on file.     Social History  She has no history on file for tobacco use, alcohol use, and drug use.    Family History  No family history on file.     Allergies  Patient has no known allergies.    Review of Systems  10 point review of systems otherwise negative except as mentioned above     Physical Exam  Awake alert and oriented no acute distress pupils are equal round react to light extraocular muscles are intact  Neck is supple with full range of motion  Heart is regular rate and rhythm  Abdomen soft and nontender  Left lower extremity shortened and rotated she has significant pain with internal ex rotation neurologically intact distally brisk cap refill compartments are soft calf is nontender     Last Recorded Vitals  Blood pressure 180/74, pulse 76, temperature 35.9 °C (96.6 °F), temperature source Temporal, resp. rate 14, height 1.575 m (5' 2.01\"), weight 48 kg (105 lb 13.1 oz), SpO2 98%.    Relevant Results  X-rays show displaced femoral neck fracture left     Assessment/Plan "     Left displaced femoral neck fracture  Plan is for hemiarthroplasty left hip.  Patient with significant impairment of bodily function related to her left hip fracture we discussed surgical nonsurgical options she like to proceed with left hip hemiarthroplasty the procedures risk benefits treatment alternatives and postoperative course were discussed with her she understands and wishes to proceed.  Consent signed.          Finesse Porter MD

## 2024-11-09 NOTE — ANESTHESIA PREPROCEDURE EVALUATION
"Marita Aquino is a 79 y.o. female here for:      Arthroplasty Partial Hip  With Finesse Porter MD  Pre-Op Diagnosis Codes:      * Left displaced femoral neck fracture [S72.002A]    Lab Results   Component Value Date    HGB 14.1 11/09/2024    HCT 42.4 11/09/2024    WBC 12.7 (H) 11/09/2024     11/09/2024     11/09/2024    K 4.0 11/09/2024     11/09/2024    CREATININE 0.62 11/09/2024    BUN 9 11/09/2024       Social History     Substance and Sexual Activity   Drug Use Not on file      Tobacco Use: Not on file      Social History     Substance and Sexual Activity   Alcohol Use None        No Known Allergies    Current Outpatient Medications   Medication Instructions    lisinopril 10 mg tablet 1 tablet, Daily (0630)       History reviewed. No pertinent past medical history.    History reviewed. No pertinent surgical history.    No family history on file.    Relevant Problems   Cardiac   (+) Primary hypertension       Visit Vitals  /74 (BP Location: Left arm, Patient Position: Lying)   Pulse 76   Temp 35.9 °C (96.6 °F) (Temporal)   Resp 14   Ht 1.575 m (5' 2.01\")   Wt 48 kg (105 lb 13.1 oz)   SpO2 98%   BMI 19.35 kg/m²   Smoking Status Unknown   BSA 1.45 m²       NPO Details:  NPO/Void Status  Date of Last Liquid: 11/08/24  Date of Last Solid: 11/08/24        Physical Exam    Airway  Mallampati: II     Cardiovascular - normal exam     Dental   (+) upper dentures     Pulmonary - normal exam     Abdominal - normal exam  Abdomen: soft             Anesthesia Plan    History of general anesthesia?: yes  History of complications of general anesthesia?: no    ASA 3     general     intravenous induction   Anesthetic plan and risks discussed with patient.      "

## 2024-11-09 NOTE — PROGRESS NOTES
11/09/24 0901   Discharge Planning   Living Arrangements Children   Support Systems Children   Assistance Needed yes, Pt very [place] and poor historian, states that she ambulates no AD but does own a walker, doesn't drive- son drives, fall PTA   Type of Residence Private residence   Number of Stairs to Enter Residence 1   Number of Stairs Within Residence 2   Do you have animals or pets at home? No   Home or Post Acute Services Post acute facilities (Rehab/SNF/etc)   Type of Post Acute Facility Services Rehab;Skilled nursing   Expected Discharge Disposition SNF   Does the patient need discharge transport arranged? Yes   RoundTrip coordination needed? Yes   Has discharge transport been arranged? No   Financial Resource Strain   How hard is it for you to pay for the very basics like food, housing, medical care, and heating? Not hard   Housing Stability   In the last 12 months, was there a time when you were not able to pay the mortgage or rent on time? N   In the past 12 months, how many times have you moved where you were living? 0   At any time in the past 12 months, were you homeless or living in a shelter (including now)? N   Transportation Needs   In the past 12 months, has lack of transportation kept you from medical appointments or from getting medications? no   In the past 12 months, has lack of transportation kept you from meetings, work, or from getting things needed for daily living? No     Pt admitted after syncope and fall at [employer] with left hip fracture. Orthopedics and Cardiology on consult. Pt is planned to go to OR for Left hip hemiarthroplasty when Cardiac clearance is received. Met with pt who is very hard of hearing and poor historian, pt able to provide basic information, and gives permission to speak with her son. CT team will monitor case for progression and follow up post op for PT/OT eval [facility] scores to aide in DC planning.

## 2024-11-09 NOTE — CARE PLAN
The patient's goals for the shift include rest and comfort    The clinical goals for the shift include pain management    Over the shift, the patient did make progress toward the following goals. Barriers to progression include none. Recommendations to address these barriers include none.

## 2024-11-09 NOTE — H&P
Medical Group History and Physical    ASSESSMENT & PLAN:     Left hip fracture  Mechanical fall  Possible syncope?  - Consult Ortho  - there is shortening and external rotation of LLE, sensation intact, able to move all toes; 3D CT obtained - results pending for Ortho review  - Pain control: flexril, morphine, oxy  - Bedrest until Ortho eval  - Will need PT/OT at later time, with anticipation for SNF at discharge  - Telemetry 48 hours, unclear if patient had a true syncopal episode, EKG and trop stable  - no significant cardiac hx noted on chart review - poor historian, will order limited Echo for possible syncope.   - NO hx of DM2, CKD, HTN, CVA, or MI    VTE Prophylaxis: lovenox, may need to hold after ortho eval    KEYON Ceballos-CNP    HISTORY OF PRESENT ILLNESS:   Chief Complaint: fall with pain to Left leg    History Of Present Illness:    Marita Aquino is a 79 y.o. female with a significant past medical history of compression fracture T4 [2011] otherwise does not appear to be following with any PCP presenting to [facility] s/p fall resulting in left hip fracture.  Patient with uncontrolled pain, shortening and external rotation of LLE, sensation intact able to move all toes.  It is unclear whether patient had true syncopal event resulting in fall as this patient is a poor historian and very hard of hearing.  Head CT negative, chest x-ray negative, she is NOT on anticoagulation, EKG is negative, no complaints of chest pain.  3D CT left hip ordered - results pending for Ortho review, there is no medical history noted of DM2, CKD, HTN, CVA or MI.    Ready for admission under general medicine for orthopedic evaluation of left hip fracture.     Review of systems: 10 point review of systems is otherwise negative except as mentioned above.    PAST HISTORIES:       Past Medical History:  Medical Problems       Problem List       * (Principal) Syncope, unspecified syncope type    Closed left hip fracture  "   Overview Signed 11/8/2024  8:28 PM by KEYON Ceballos-CNP     Consult ortho, pain control overnight - waiting recs                Past Surgical History:  History reviewed. No pertinent surgical history.       Social History:  She has no history on file for tobacco use, alcohol use, and drug use.    Family History:  No family history on file.     Allergies:  Patient has no known allergies.    OBJECTIVE:       Last Recorded Vitals:  Vitals:    11/08/24 1731   BP: (!) 182/78   Pulse: 74   Resp: 16   Temp: 35.7 °C (96.3 °F)   SpO2: 99%   Weight: 59 kg (130 lb)   Height: 1.575 m (5' 2\")       Last I/O:  No intake/output data recorded.    Physical Exam  Vitals and nursing note reviewed.   Constitutional:       General: She is in acute distress.      Appearance: Normal appearance.   HENT:      Head: Normocephalic and atraumatic.      Comments: No signs of injury, no abrasions, hematoma or bruising.     Ears:      Comments: Extremely hard of hearing     Nose: Nose normal.      Mouth/Throat:      Mouth: Mucous membranes are moist.      Pharynx: Oropharynx is clear.   Eyes:      Extraocular Movements: Extraocular movements intact.      Conjunctiva/sclera: Conjunctivae normal.      Pupils: Pupils are equal, round, and reactive to light.   Cardiovascular:      Rate and Rhythm: Normal rate and regular rhythm.      Pulses: Normal pulses.      Heart sounds: Normal heart sounds.   Pulmonary:      Effort: Pulmonary effort is normal.      Breath sounds: Normal breath sounds.   Abdominal:      General: Abdomen is flat. Bowel sounds are normal.      Palpations: Abdomen is soft.   Genitourinary:     Comments: Not assessed  Musculoskeletal:         General: Deformity and signs of injury present.      Cervical back: Normal range of motion and neck supple.      Comments: Sensation intact to the LLE, able to move all toes, shortening and external rotation of LLE     Skin:     General: Skin is dry.      Capillary Refill: Capillary " refill takes 2 to 3 seconds.   Neurological:      General: No focal deficit present.      Mental Status: She is alert and oriented to person, place, and time. Mental status is at baseline.      Comments: Sensation intact to the LLE, able to move all toes, shortening and external rotation of LLE   Psychiatric:         Mood and Affect: Mood normal.         Behavior: Behavior normal.         Thought Content: Thought content normal.         Judgment: Judgment normal.           Scheduled Medications  HYDROmorphone, 0.5 mg, intravenous, Once      PRN Medications    Continuous Medications       Outpatient Medications:  Prior to Admission medications    Not on File       LABS AND IMAGING:     Labs:  Results for orders placed or performed during the hospital encounter of 11/08/24 (from the past 24 hours)   ECG 12 lead   Result Value Ref Range    Ventricular Rate 79 BPM    Atrial Rate 79 BPM    FL Interval 136 ms    QRS Duration 68 ms    QT Interval 370 ms    QTC Calculation(Bazett) 424 ms    P Axis 65 degrees    R Axis 51 degrees    T Axis 56 degrees    QRS Count 13 beats    Q Onset 216 ms    P Onset 148 ms    P Offset 196 ms    T Offset 401 ms    QTC Fredericia 405 ms   Protime-INR   Result Value Ref Range    Protime 12.4 9.8 - 12.8 seconds    INR 1.1 0.9 - 1.1   aPTT   Result Value Ref Range    aPTT 27 27 - 38 seconds   CBC and Auto Differential   Result Value Ref Range    WBC 8.5 4.4 - 11.3 x10*3/uL    nRBC 0.0 0.0 - 0.0 /100 WBCs    RBC 4.76 4.00 - 5.20 x10*6/uL    Hemoglobin 14.3 12.0 - 16.0 g/dL    Hematocrit 43.1 36.0 - 46.0 %    MCV 91 80 - 100 fL    MCH 30.0 26.0 - 34.0 pg    MCHC 33.2 32.0 - 36.0 g/dL    RDW 12.6 11.5 - 14.5 %    Platelets 239 150 - 450 x10*3/uL    Neutrophils % 76.7 40.0 - 80.0 %    Immature Granulocytes %, Automated 0.8 0.0 - 0.9 %    Lymphocytes % 11.2 13.0 - 44.0 %    Monocytes % 9.8 2.0 - 10.0 %    Eosinophils % 1.1 0.0 - 6.0 %    Basophils % 0.4 0.0 - 2.0 %    Neutrophils Absolute 6.53 (H)  1.60 - 5.50 x10*3/uL    Immature Granulocytes Absolute, Automated 0.07 0.00 - 0.50 x10*3/uL    Lymphocytes Absolute 0.95 0.80 - 3.00 x10*3/uL    Monocytes Absolute 0.83 (H) 0.05 - 0.80 x10*3/uL    Eosinophils Absolute 0.09 0.00 - 0.40 x10*3/uL    Basophils Absolute 0.03 0.00 - 0.10 x10*3/uL   Basic metabolic panel   Result Value Ref Range    Glucose 194 (H) 74 - 99 mg/dL    Sodium 138 136 - 145 mmol/L    Potassium 3.8 3.5 - 5.3 mmol/L    Chloride 103 98 - 107 mmol/L    Bicarbonate 27 21 - 32 mmol/L    Anion Gap 12 10 - 20 mmol/L    Urea Nitrogen 10 6 - 23 mg/dL    Creatinine 0.88 0.50 - 1.05 mg/dL    eGFR 67 >60 mL/min/1.73m*2    Calcium 9.1 8.6 - 10.3 mg/dL   Troponin I, High Sensitivity   Result Value Ref Range    Troponin I, High Sensitivity 4 0 - 13 ng/L        Imaging:  CT hip left wo IV contrast   Final Result   Displaced, comminuted and slightly impacted left subcapital femoral   fracture with anterior angulation of the fracture fragments.        MACRO:   None.        Signed by: Modesto Mak 11/8/2024 7:16 PM   Dictation workstation:   [id number]      CT 3D reconstruction   Final Result   Displaced, comminuted and slightly impacted left subcapital femoral   fracture with anterior angulation of the fracture fragments.        MACRO:   None.        Signed by: Modesto Mak 11/8/2024 7:16 PM   Dictation workstation:   [id number]      CT head wo IV contrast   Final Result   CT HEAD:        No evidence of hemorrhage, skull fracture, or other acute   intracranial trauma/abnormality.        CT C-SPINE:        No evidence of acute trauma to the cervical spine.        MACRO:   None        Signed by: Modesto Mak 11/8/2024 7:12 PM   Dictation workstation:   [id number]      CT cervical spine wo IV contrast   Final Result   CT HEAD:        No evidence of hemorrhage, skull fracture, or other acute   intracranial trauma/abnormality.        CT C-SPINE:        No evidence of acute trauma to the  cervical spine.        MACRO:   None        Signed by: Modesto Mak 11/8/2024 7:12 PM   Dictation workstation:   [id number]      XR hip left with pelvis when performed 2 or 3 views   Final Result   Addendum (preliminary) 1 of 1   Interpreted By:  Lucy Wilburn,    ADDENDUM:   Study:   Pelvis one view   Left hip, two views.        Signed by: Lucy Wilburn 11/8/2024 7:38 PM        -------- ORIGINAL REPORT --------   Dictation workstation:   [id number]      Final   1. Subcapital left femoral neck fracture.        MACRO:   None.        Signed by: Lucy Wilburn 11/8/2024 6:44 PM   Dictation workstation:   [id number]      XR chest 1 view   Final Result   1. No acute cardiopulmonary process.        MACRO:   None.        Signed by: Lucy Wilburn 11/8/2024 6:45 PM   Dictation workstation:   [id number]

## 2024-11-09 NOTE — CONSULTS
Cardiology Consult Note      Date:   11/9/2024  Patient name:  Marita Aquino  Date of admission:  11/8/2024  5:25 PM  MRN:   [id number]  YOB: 1945  Time of Consult:  10:16 AM    Consulting Cardiologist: Dr. Bienvenido Elizabeth      Primary Cardiologist:      Referring Provider: Dr Vasquez      Admission Diagnosis:     Syncope, unspecified syncope type      History of Present Illness:      Marita Aquino is a 79 y.o.  female patient who is being at the request of Dr. Vasquez for inpatient consultation of preop orthopedic.  She was admitted on 11/8/2024.  Previous [facility] and [facility] records have been reviewed in detail.      Patient evidently fell at home fracturing her left femur with displaced neck fracture.  She is scheduled for surgery today.  Cardiology was asked to see her preoperatively.  It is unclear whether the patient fell down or had a syncopal episode.  Patient does not have any significant findings on echocardiogram or EKG.  Labs are stable including chemistries and CBC.  Patient unfortunately is a very poor historian and appears to have some degree of cognitive dysfunction.  She cannot completely elicit the events that occurred.  She states she lives at home with her son.  She claims no cardiac history of any type and does not appear to be under the care of any physician that she can name.  Allergies:     No Known Allergies      Past Medical History:     History reviewed. No pertinent past medical history.    Past Surgical History:     History reviewed. No pertinent surgical history.    Family History:     No family history on file.    Social History:     Social History     Tobacco Use    Smoking status: Unknown   Vaping Use    Vaping status: Unknown       CURRENT MEDICATIONS    ceFAZolin, 2 g, intravenous, Once  docusate sodium, 100 mg, oral, BID  HYDROmorphone, 0.5 mg, intravenous, Once  lisinopril, 30 mg, oral, Daily  pantoprazole, 40 mg, oral, Daily   Or  pantoprazole, 40 mg,  "intravenous, Daily  perflutren lipid microspheres, 0.5-10 mL of dilution, intravenous, Once in imaging  perflutren protein A microsphere, 0.5 mL, intravenous, Once in imaging  sulfur hexafluoride microsphr, 2 mL, intravenous, Once in imaging         Current Outpatient Medications   Medication Instructions    lisinopril 10 mg tablet 1 tablet, Daily (0630)        Review of Systems:      12 point review of systems was obtained in detail and is negative other than that detailed above.    Vital Signs:     Vitals:    11/08/24 2300 11/09/24 0010 11/09/24 0541 11/09/24 0740   BP:  176/72 177/77 180/74   BP Location:  Right arm Left arm Left arm   Patient Position:  Lying Lying Lying   Pulse:  85 90 76   Resp:  18  14   Temp:  35.7 °C (96.3 °F) 35.7 °C (96.3 °F) 35.9 °C (96.6 °F)   TempSrc:  Temporal Temporal Temporal   SpO2:  96% 95% 98%   Weight: 48 kg (105 lb 13.1 oz) 48 kg (105 lb 13.1 oz)     Height: 1.575 m (5' 2.01\") 1.575 m (5' 2.01\")         Intake/Output Summary (Last 24 hours) at 11/9/2024 1016  Last data filed at 11/9/2024 1007  Gross per 24 hour   Intake 50 ml   Output --   Net 50 ml       Wt Readings from Last 4 Encounters:   11/09/24 48 kg (105 lb 13.1 oz)       Physical Examination:     GENERAL APPEARANCE: Well developed, well nourished, in no acute distress but clearly having orientation issues  CHEST: Symmetric and non-tender.  INTEGUMENT: Skin warm and dry, without gross excoriationis or lesions.  HEENT: No gross abnormalities of conjunctiva, teeth, gums, oral mucosa  NECK: Supple, no JVD, no bruit. Thyroid not palpable. Carotid upstrokes normal.  NEURO/PSHCY: Alert but mildly disoriented, no focal deficits, no unusual weakness  LUNGS: Clear to auscultation bilaterally; normal respiratory effort.  HEART: Rate and rhythm regular with no evident murmur; no gallop appreciated. There are no rubs, clicks or heaves. PMI nondisplaced.  ABDOMEN: Soft, nontender, no palpable hepatosplenomegaly, no mases, no bruits. " "Abdominal aorta not noted to be enlarged.  MUSCULOSKELETAL: Nonambulatory due to fracture of leg   EXTREMITIES: Warm with good color, no clubbing or cyanois. There is no edema noted.  Left lower extremity shortened and rotated consistent with fracture  PERIPHERAL VASCULAR: Pulses present and equally palpable; 2+ throughout. No femoral bruits.      Lab:     CBC:   Lab Results   Component Value Date    WBC 12.7 (H) 11/09/2024    RBC 4.73 11/09/2024    HGB 14.1 11/09/2024    HCT 42.4 11/09/2024     11/09/2024        CMP:    Lab Results   Component Value Date     11/09/2024    K 4.0 11/09/2024     11/09/2024    CO2 26 11/09/2024    BUN 9 11/09/2024    CREATININE 0.62 11/09/2024    GLUCOSE 137 (H) 11/09/2024    CALCIUM 8.9 11/09/2024       Magnesium:    Lab Results   Component Value Date    MG 2.08 11/09/2024       Lipid Profile:    No results found for: \"CHLPL\", \"TRIG\", \"HDL\", \"LDLCALC\", \"LDLDIRECT\"    TSH:    Lab Results   Component Value Date    TSH 0.73 11/09/2024       BNP:   No results found for: \"BNP\"     PT/INR:    Lab Results   Component Value Date    PROTIME 12.4 11/08/2024    INR 1.1 11/08/2024       HgBA1c:    Lab Results   Component Value Date    HGBA1C 4.8 12/08/2021       BMP:  Lab Results   Component Value Date     11/09/2024     11/08/2024    K 4.0 11/09/2024    K 3.8 11/08/2024     11/09/2024     11/08/2024    CO2 26 11/09/2024    CO2 27 11/08/2024    BUN 9 11/09/2024    BUN 10 11/08/2024    CREATININE 0.62 11/09/2024    CREATININE 0.88 11/08/2024       CBC:  Lab Results   Component Value Date    WBC 12.7 (H) 11/09/2024    WBC 8.5 11/08/2024    RBC 4.73 11/09/2024    RBC 4.76 11/08/2024    HGB 14.1 11/09/2024    HGB 14.3 11/08/2024    HCT 42.4 11/09/2024    HCT 43.1 11/08/2024    MCV 90 11/09/2024    MCV 91 11/08/2024    MCH 29.8 11/09/2024    MCH 30.0 11/08/2024    MCHC 33.3 11/09/2024    MCHC 33.2 11/08/2024    RDW 12.7 11/09/2024    RDW 12.6 11/08/2024    "  11/09/2024     11/08/2024       Cardiac Enzymes:    Lab Results   Component Value Date    TROPHS 4 11/08/2024       Hepatic Function Panel:    Lab Results   Component Value Date    ALKPHOS 59 11/09/2024    ALT 9 11/09/2024    AST 17 11/09/2024    PROT 6.9 11/09/2024    BILITOT 0.9 11/09/2024       Diagnostic Studies:     Transthoracic Echo (TTE) Complete    Result Date: 11/9/2024          [place] [address]  Tel [phone] Fax [phone] TRANSTHORACIC ECHOCARDIOGRAM REPORT Patient Name:       HALEIGH ROYAL KIZZY    Reading Physician:    [id number] Bienvenido Elizabeth DO Study Date:         11/9/2024            Ordering Provider:    [id number] LUDWIN REICH MRN/PID:            [id number]             Fellow: Accession#:         [id number]         Nurse: Date of Birth/Age:  1945 / 79 years Sonographer:          Daylin STALLWORTH Gender Assigned at                      Additional Staff: Birth: Height:             157.48 cm            Admit Date:           11/8/2024 Weight:             47.63 kg             Admission Status:     Inpatient - STAT BSA / BMI:          1.45 m2 / 19.20      Department Location:  [address] [place], [place] [address]                     kg/m2 Blood Pressure: 182 /78 mmHg Study Type:    TRANSTHORACIC ECHO (TTE) COMPLETE Diagnosis/ICD: Encounter for other preprocedural examination-Z01.818 Indication:    Pre-Op Evaluation CPT Codes:     Echo Complete w Full Doppler-[id number]  Study Detail: The following Echo studies were performed: 2D, M-Mode, Doppler and               color flow. Technically challenging study due to patient lying in               supine position. The patient was awake.  PHYSICIAN INTERPRETATION: Left Ventricle: Left ventricular ejection fraction is normal, by visual  estimate at 60%. There are no regional wall motion abnormalities. The left ventricular cavity size is normal. There is normal septal and normal posterior left ventricular wall thickness. There is left ventricular concentric remodeling. Spectral Doppler shows a Grade I (impaired relaxation pattern) of left ventricular diastolic filling with normal left atrial filling pressure. LV Wall Scoring: All segments are normal. Left Atrium: The left atrium is normal in size. Right Ventricle: The right ventricle is normal in size. There is normal right ventricular global systolic function. Right Atrium: The right atrium is normal in size. Aortic Valve: The aortic valve appears structurally normal. There is no evidence of aortic valve stenosis. The aortic valve dimensionless index is 0.85. There is no evidence of aortic valve regurgitation. The peak instantaneous gradient of the aortic valve is 7 mmHg. The mean gradient of the aortic valve is 4 mmHg. Mitral Valve: The mitral valve is normal in structure. There is mild thickening and calcification of the anterior and posterior mitral valve leaflets. There is no evidence of mitral valve stenosis. There is normal mitral valve leaflet mobility. There is no evidence of mitral valve regurgitation. Tricuspid Valve: The tricuspid valve is structurally normal. There is normal tricuspid valve leaflet mobility. There is mild tricuspid regurgitation. Pulmonic Valve: The pulmonic valve is structurally normal. There is trace pulmonic valve regurgitation. Pericardium: No pericardial effusion noted. Aorta: The aortic root is normal. Pulmonary Artery: The main pulmonary artery is normal in size, and position, with normal bifurcation into the left and right pulmonary arteries. The tricuspid regurgitant velocity is 2.99 m/s, and with an estimated right atrial pressure of 3 mmHg, the estimated pulmonary artery pressure is mild to moderately elevated with the RVSP at 38.8 mmHg. Systemic Veins: The  inferior vena cava appears normal in size. In comparison to the previous echocardiogram(s): There are no prior studies on this patient for comparison purposes.  CONCLUSIONS:  1. Left ventricular ejection fraction is normal, by visual estimate at 60%.  2. There is normal right ventricular global systolic function.  3. There is no evidence of mitral valve stenosis.  4. No evidence of mitral valve regurgitation.  5. Aortic valve stenosis is not present.  6. Mild to moderately elevated pulmonary artery pressure.  7. The main pulmonary artery is normal in size, and position, with normal bifurcation into the left and right pulmonary arteries. RECOMMENDATIONS: Technically suboptimal and limited study, therefore accuracy of above interpretation could be substantially diminished. Clinical correlation is advised. Consider additional imaging modalities if clinically indicated.  QUANTITATIVE DATA SUMMARY:  2D MEASUREMENTS:           Normal Ranges: Ao Root d:       2.50 cm   (2.0-3.7cm) LAs:             2.50 cm   (2.7-4.0cm) IVSd:            0.70 cm   (0.6-1.1cm) LVPWd:           0.70 cm   (0.6-1.1cm) LVIDd:           3.20 cm   (3.9-5.9cm) LVIDs:           2.30 cm LV Mass Index:   37.4 g/m2 LV % FS          28.1 %  LA VOLUME:                    Normal Ranges: LA Vol A4C:        20.8 ml    (22+/-6mL/m2) LA Vol A2C:        19.8 ml LA Vol BP:         20.6 ml LA Vol Index A4C:  14.3ml/m2 LA Vol Index A2C:  13.6 ml/m2 LA Vol Index BP:   14.1 ml/m2 LA Area A4C:       10.4 cm2 LA Area A2C:       10.3 cm2 LA Major Axis A4C: 4.4 cm LA Major Axis A2C: 4.6 cm LA Volume Index:   13.3 ml/m2  RA VOLUME BY A/L METHOD:            Normal Ranges: RA Vol A4C:              22.6 ml    (8.3-19.5ml) RA Vol Index A4C:        15.5 ml/m2 RA Area A4C:             10.5 cm2 RA Major Axis A4C:       4.2 cm  AORTA MEASUREMENTS:         Normal Ranges: Asc Ao, d:          2.30 cm (2.1-3.4cm)  LV SYSTOLIC FUNCTION BY 2D PLANIMETRY (MOD):                       Normal Ranges: EF-A4C View:    61 % (>=55%) EF-A2C View:    58 % EF-Biplane:     58 % EF-Visual:      60 % LV EF Reported: 60 %  LV DIASTOLIC FUNCTION:             Normal Ranges: MV Peak E:             0.74 m/s    (0.7-1.2 m/s) MV Peak A:             0.94 m/s    (0.42-0.7 m/s) E/A Ratio:             0.79        (1.0-2.2) MV e'                  0.097 m/s   (>8.0) MV lateral e'          0.10 m/s MV medial e'           0.09 m/s E/e' Ratio:            7.57        (<8.0) PulmV Sys Shar:         48.40 cm/s PulmV Tucker Shar:        40.30 cm/s PulmV S/D Shar:         1.20 PulmV A Revs Shar:      38.60 cm/s PulmV A Revs Dur:      137.00 msec  MITRAL VALVE:          Normal Ranges: MV DT:        262 msec (150-240msec)  AORTIC VALVE:                     Normal Ranges: AoV Vmax:                1.34 m/s (<=1.7m/s) AoV Peak P.2 mmHg (<20mmHg) AoV Mean P.0 mmHg (1.7-11.5mmHg) LVOT Max Shar:            1.02 m/s (<=1.1m/s) AoV VTI:                 27.40 cm (18-25cm) LVOT VTI:                23.30 cm LVOT Diameter:           1.90 cm  (1.8-2.4cm) AoV Area, VTI:           2.41 cm2 (2.5-5.5cm2) AoV Area,Vmax:           2.16 cm2 (2.5-4.5cm2) AoV Dimensionless Index: 0.85  RIGHT VENTRICLE: RV Basal 3.00 cm RV Mid   2.60 cm RV Major 6.2 cm TAPSE:   16.1 mm RV s'    0.13 m/s  TRICUSPID VALVE/RVSP:          Normal Ranges: Peak TR Velocity:     2.99 m/s RV Syst Pressure:     39 mmHg  (< 30mmHg) IVC Diam:             0.70 cm  PULMONIC VALVE:          Normal Ranges: PV Accel Time:  50 msec  (>120ms) PV Max Shar:     1.0 m/s  (0.6-0.9m/s) PV Max PG:      3.8 mmHg  Pulmonary Veins: PulmV A Revs Dur: 137.00 msec PulmV A Revs Shar: 38.60 cm/s PulmV Tucker Shar:   40.30 cm/s PulmV S/D Shar:    1.20 PulmV Sys Shar:    48.40 cm/s  [id number] Bienvenido Elizabeth DO Electronically signed on 2024 at 9:36:37 AM  Wall Scoring  ** Final **     XR hip left with pelvis when performed 2 or 3 views    Addendum Date: 2024    Interpreted By:   Lucy Wilburn, ADDENDUM: Study: Pelvis one view Left hip, two views.   Signed by: Lucy Wilburn 11/8/2024 7:38 PM   -------- ORIGINAL REPORT -------- Dictation workstation:   [id number]    Result Date: 11/8/2024  Interpreted By:  Lucy Wilburn, STUDY: Left hip, two views   INDICATION: Signs/Symptoms:fall, left hip pain.   COMPARISON: None.   ACCESSION NUMBER(S): [id number]   ORDERING CLINICIAN: HUEY NARAYANAN   FINDINGS: There is a nondisplaced mildly impacted subcapital left femoral neck fracture with varus angulation of the femoral shaft. Soft tissues are within normal limits. Left hip joint space is maintained.       1. Subcapital left femoral neck fracture.   MACRO: None.   Signed by: Lucy Wilburn 11/8/2024 6:44 PM Dictation workstation:   [id number]    CT hip left wo IV contrast    Result Date: 11/8/2024  Interpreted By:  Modesto Mak, STUDY: CT HIP LEFT WO IV CONTRAST; CT 3D RECONSTRUCTION;  11/8/2024 6:54 pm   INDICATION: Signs/Symptoms:hip fx; Signs/Symptoms:trauma.     COMPARISON: Same day radiographs   ACCESSION NUMBER(S): [id number]; [id number]   ORDERING CLINICIAN: HUEY NARAYANAN   TECHNIQUE: CT imaging of the left hip was obtained without contrast. Coronal and sagittal reformatted images were performed. 3D reconstructed images were created on an independent workstation and reviewed.   FINDINGS: OSSEOUS STRUCTURES:   There is redemonstration of the displaced, comminuted and slightly impacted left subcapital femoral fracture, with anterior angulation of the fracture fragments. There is a small associated joint effusion with mild edema present in the adjacent musculature.   No other acute bony abnormalities are present in the partially visualized pelvis or sacrum.   SOFT TISSUES:   Mild fat stranding is present in the cutaneous tissues of the posterior left hip without evidence of fluid collection.   Numerous diverticula are partially visualized in the sigmoid colon  without evidence of acute abnormality within the partially visualized abdomen and pelvis.       Displaced, comminuted and slightly impacted left subcapital femoral fracture with anterior angulation of the fracture fragments.   MACRO: None.   Signed by: Modesto Mak 11/8/2024 7:16 PM Dictation workstation:   [id number]    CT 3D reconstruction    Result Date: 11/8/2024  Interpreted By:  Modesto Mak, STUDY: CT HIP LEFT WO IV CONTRAST; CT 3D RECONSTRUCTION;  11/8/2024 6:54 pm   INDICATION: Signs/Symptoms:hip fx; Signs/Symptoms:trauma.     COMPARISON: Same day radiographs   ACCESSION NUMBER(S): [id number]; [id number]   ORDERING CLINICIAN: HUEY NARAYANAN   TECHNIQUE: CT imaging of the left hip was obtained without contrast. Coronal and sagittal reformatted images were performed. 3D reconstructed images were created on an independent workstation and reviewed.   FINDINGS: OSSEOUS STRUCTURES:   There is redemonstration of the displaced, comminuted and slightly impacted left subcapital femoral fracture, with anterior angulation of the fracture fragments. There is a small associated joint effusion with mild edema present in the adjacent musculature.   No other acute bony abnormalities are present in the partially visualized pelvis or sacrum.   SOFT TISSUES:   Mild fat stranding is present in the cutaneous tissues of the posterior left hip without evidence of fluid collection.   Numerous diverticula are partially visualized in the sigmoid colon without evidence of acute abnormality within the partially visualized abdomen and pelvis.       Displaced, comminuted and slightly impacted left subcapital femoral fracture with anterior angulation of the fracture fragments.   MACRO: None.   Signed by: Modesto Mak 11/8/2024 7:16 PM Dictation workstation:   [id number]    CT head wo IV contrast    Result Date: 11/8/2024  Interpreted By:  Modesto Mak, STUDY: CT HEAD WO IV CONTRAST; CT CERVICAL  SPINE WO IV CONTRAST;  11/8/2024 6:53 pm   INDICATION: Signs/Symptoms:syncope, fall; Signs/Symptoms:fall.     COMPARISON: CT head dated 02/03/2012.   ACCESSION NUMBER(S): [id number]; [id number]   ORDERING CLINICIAN: HUEY NARAYANAN   TECHNIQUE: Noncontrast axial CT scan of head was performed, with coronal and sagittal reformats provided. The images were reviewed in bone, brain, blood and soft tissue windows.   Axial CT images of the cervical spine are obtained. Axial, coronal and sagittal reconstructions are provided for review.   FINDINGS: CT HEAD:   No hyperdense intracranial hemorrhage is present. There is no mass effect or midline shift.   Gray-white differentiation is intact, without evidence of CT apparent transcortical infarct. Subtle attenuation changes present in the periventricular and subcortical white matter of bilateral cerebral hemispheres are nonspecific, but favored to represent sequela of microvascular disease.   No abnormal ventricular dilatation is present. Basal cisterns are patent. No extra-axial fluid collection is identified.   Scalp soft tissues do not demonstrate any acute abnormality. No skull fracture or other acute calvarial abnormality is present.   Mastoid air cells and middle ear cavities are clear without evidence of acute abnormality. Visualized paranasal sinuses are unremarkable in appearance.   CT C-SPINE:   Cervical vertebral alignment is maintained, without significant spondylolisthesis.   Cervical vertebral body heights are preserved, without evidence of compression fractures. Posterior elements of the cervical spine do not demonstrate any evidence of acute trauma.   Craniocervical junction is intact. Facet joints are preserved without evidence of subluxation or perching.   No significant intervertebral disc height loss is present. Several large anterior disc osteophytes are present at the level of C3-C4, C4-C5, C5-C6 and to lesser extent C6-C7.   No high-grade spinal canal  stenosis is present. Small disc bulge is evident at the level of C5-C6, with some effacement of the anterior subarachnoid space and mild associated spinal canal narrowing in conjunction with ligamentum flavum thickening.   No significant bony neural foraminal stenosis is identified.   Prevertebral and paraspinal soft tissues are unremarkable in appearance.       CT HEAD:   No evidence of hemorrhage, skull fracture, or other acute intracranial trauma/abnormality.   CT C-SPINE:   No evidence of acute trauma to the cervical spine.   MACRO: None   Signed by: Modesto Mak 11/8/2024 7:12 PM Dictation workstation:   [id number]    CT cervical spine wo IV contrast    Result Date: 11/8/2024  Interpreted By:  Modesto Mak, STUDY: CT HEAD WO IV CONTRAST; CT CERVICAL SPINE WO IV CONTRAST;  11/8/2024 6:53 pm   INDICATION: Signs/Symptoms:syncope, fall; Signs/Symptoms:fall.     COMPARISON: CT head dated 02/03/2012.   ACCESSION NUMBER(S): [id number]; [id number]   ORDERING CLINICIAN: HUEY NARAYANAN   TECHNIQUE: Noncontrast axial CT scan of head was performed, with coronal and sagittal reformats provided. The images were reviewed in bone, brain, blood and soft tissue windows.   Axial CT images of the cervical spine are obtained. Axial, coronal and sagittal reconstructions are provided for review.   FINDINGS: CT HEAD:   No hyperdense intracranial hemorrhage is present. There is no mass effect or midline shift.   Gray-white differentiation is intact, without evidence of CT apparent transcortical infarct. Subtle attenuation changes present in the periventricular and subcortical white matter of bilateral cerebral hemispheres are nonspecific, but favored to represent sequela of microvascular disease.   No abnormal ventricular dilatation is present. Basal cisterns are patent. No extra-axial fluid collection is identified.   Scalp soft tissues do not demonstrate any acute abnormality. No skull fracture or other acute  calvarial abnormality is present.   Mastoid air cells and middle ear cavities are clear without evidence of acute abnormality. Visualized paranasal sinuses are unremarkable in appearance.   CT C-SPINE:   Cervical vertebral alignment is maintained, without significant spondylolisthesis.   Cervical vertebral body heights are preserved, without evidence of compression fractures. Posterior elements of the cervical spine do not demonstrate any evidence of acute trauma.   Craniocervical junction is intact. Facet joints are preserved without evidence of subluxation or perching.   No significant intervertebral disc height loss is present. Several large anterior disc osteophytes are present at the level of C3-C4, C4-C5, C5-C6 and to lesser extent C6-C7.   No high-grade spinal canal stenosis is present. Small disc bulge is evident at the level of C5-C6, with some effacement of the anterior subarachnoid space and mild associated spinal canal narrowing in conjunction with ligamentum flavum thickening.   No significant bony neural foraminal stenosis is identified.   Prevertebral and paraspinal soft tissues are unremarkable in appearance.       CT HEAD:   No evidence of hemorrhage, skull fracture, or other acute intracranial trauma/abnormality.   CT C-SPINE:   No evidence of acute trauma to the cervical spine.   MACRO: None   Signed by: Modesto Mak 11/8/2024 7:12 PM Dictation workstation:   [id number]    XR chest 1 view    Result Date: 11/8/2024  Interpreted By:  Lucy Wilburn, STUDY: Chest, single AP view.   INDICATION: Signs/Symptoms:fall.   COMPARISON: 06/15/2015   ACCESSION NUMBER(S): [id number]   ORDERING CLINICIAN: HUEY NARAYANAN   FINDINGS: The cardiac silhouette size is within normal limits. There is no focal consolidation, edema or pneumothorax. No sizeable pleural effusion. No acute osseous abnormality.       1. No acute cardiopulmonary process.   MACRO: None.   Signed by: Lucy Wilburn 11/8/2024  6:45 PM Dictation workstation:   [id number]    ECG 12 lead    Result Date: 11/8/2024  Normal sinus rhythm Normal ECG When compared with ECG of 25-MAY-2017 09:37, No significant change was found      Radiology:     Transthoracic Echo (TTE) Complete   Final Result      CT hip left wo IV contrast   Final Result   Displaced, comminuted and slightly impacted left subcapital femoral   fracture with anterior angulation of the fracture fragments.        MACRO:   None.        Signed by: Modesto Mak 11/8/2024 7:16 PM   Dictation workstation:   [id number]      CT 3D reconstruction   Final Result   Displaced, comminuted and slightly impacted left subcapital femoral   fracture with anterior angulation of the fracture fragments.        MACRO:   None.        Signed by: Modesto Mak 11/8/2024 7:16 PM   Dictation workstation:   [id number]      CT head wo IV contrast   Final Result   CT HEAD:        No evidence of hemorrhage, skull fracture, or other acute   intracranial trauma/abnormality.        CT C-SPINE:        No evidence of acute trauma to the cervical spine.        MACRO:   None        Signed by: Modesto Mak 11/8/2024 7:12 PM   Dictation workstation:   [id number]      CT cervical spine wo IV contrast   Final Result   CT HEAD:        No evidence of hemorrhage, skull fracture, or other acute   intracranial trauma/abnormality.        CT C-SPINE:        No evidence of acute trauma to the cervical spine.        MACRO:   None        Signed by: Modesto Mak 11/8/2024 7:12 PM   Dictation workstation:   [id number]      XR hip left with pelvis when performed 2 or 3 views   Final Result   Addendum (preliminary) 1 of 1   Interpreted By:  Lucy Wilburn,    ADDENDUM:   Study:   Pelvis one view   Left hip, two views.        Signed by: Lucy Wilburn 11/8/2024 7:38 PM        -------- ORIGINAL REPORT --------   Dictation workstation:   [id number]      Final   1. Subcapital left femoral neck  fracture.        MACRO:   None.        Signed by: Lucy Wilburn 11/8/2024 6:44 PM   Dictation workstation:   [id number]      XR chest 1 view   Final Result   1. No acute cardiopulmonary process.        MACRO:   None.        Signed by: Lucy Wilburn 11/8/2024 6:45 PM   Dictation workstation:   [id number]      Operative Images    (Results Pending)   Carotid duplex bilateral    (Results Pending)       Problem List:     Patient Active Problem List   Diagnosis    Closed left hip fracture    Syncope, unspecified syncope type    Left displaced femoral neck fracture    Primary hypertension       Assessment:         79-year-old female seen and evaluated at the bedside in the telemetry/orthopedics unit    Meds, vitals, examination as noted.    Chart review details cussed the patient and staff and the attending Dr. Porter.    Impression:  Fall leading to left femoral fracture (no clear-cut or objective findings to suggest true syncope)  Advanced cognitive dysfunction possible Alzheimer's dementia  Hypertension based on patient being on antihypertensive medicines as outpatient  Extremely poor historian  No indication of significant cardiovascular findings at this point with normal EKG and normal echo  Plan:     Recommendation:  Patient should proceed with surgery with low overall cardiovascular risk  Patient should remain on telemetry throughout the course of hospitalization   will be necessary for management as the patient is prepared for discharge or presents to rehabilitation services.  Consider neurological evaluation while hospitalized  We will continue to follow    Thank you for the opportunity to participate in the care of your patient.  Please do not hesitate to call if you have any questions.    Electronically signed by Bienvenido Elizabeth DO, on 11/9/2024 at 10:16 AM

## 2024-11-09 NOTE — DISCHARGE INSTRUCTIONS
Total Hip Replacement   Discharge Instructions    To prevent Clot formation, you have been placed on the following medication:  Lovenox 40 mg subcutaneous daily for 28 days. Started on 11/10/24.    Surgical Site Care:  Change dressing once a day and PRN (as needed). Apply 4 x 4 sponge and light tape. If glue present, leave open to air.  You may leave wound open to air after initial dressing removal, if wound is clean, dry and intact  If Aquacel Ag dressing is present, do not remove dressing for 7 days, unless heavily saturated. If heavily saturated, remove dressing and start using instructions above  Staples will be removed on post-operative day 14 and steri-strips applied  Showering is permitted starting POD1 if waterproof Aquacel dressing is present or when the incision is covered with 4 x 4 and Tegaderm waterproof dressing  Until all areas of incision are healed.    Physical Therapy:  Weight Bearing Status:  Weight-bearing as tolerated  Posterior Hip precautions, per therapy handout     Pain Medications  You were given  Oxycodone  Wean off pain medications as you deem appropriate as long as pain is under control  Do not exceed 4,000 mg of acetaminophen from all sources in a 24 hour period    Cold packs/Ice packs/Machine  May be used 5 times daily for 15-30 minutes as necessary  Be sure to have a barrier (cloth, clothing, towel) between the site and the ice pack to prevent frostbite    Contact [facility] office if  Increased redness, swelling, drainage of any kind, and/or pain to surgery site.  As well as new onset fevers and or chills.  These could signify an infection.  Calf or thigh tenderness to touch as well as increased swelling or redness.  This could signify a clot formation.  Numbness or tingling to an area around the incision site or below the incision site (toes).  Any rash appears, increased  or new onset nausea/vomiting occur.  This may indicate a reaction to a medication.  Phone #  [phone].  Follow up with Surgeon in 2 weeks  I acknowledge that I have received moriah hose and understand the instructions on how and when to wear them (on during the day, off at night)   Discharging RN who has gone over instructions and acknowledges moriah hose have been received     Ice 5 times a day for 20 minutes each session to operative extremity for two weeks.   MORIAH hose to be worn for three weeks. Can be removed for skin care and hygiene.   Incentive spirometer 10 times every hour while awake for two weeks.

## 2024-11-09 NOTE — NURSING NOTE
Notified patient's son that patient was taken to holding room in preparation for surgery this morning.

## 2024-11-09 NOTE — ANESTHESIA PROCEDURE NOTES
Airway  Date/Time: 11/9/2024 9:59 AM  Urgency: elective    Airway not difficult    Staffing  Performed: attending   Authorized by: Celso Land MD    Performed by: Celso Land MD  Patient location during procedure: OR    Indications and Patient Condition  Indications for airway management: anesthesia  Spontaneous ventilation: present  Sedation level: no sedation  Preoxygenated: yes  Patient position: sniffing  MILS not maintained throughout  Mask difficulty assessment: 0 - not attempted  Planned trial extubation    Final Airway Details  Final airway type: endotracheal airway      Successful airway: ETT  Cuffed: yes   Successful intubation technique: video laryngoscopy  Facilitating devices/methods: intubating stylet  Endotracheal tube insertion site: oral  Blade type: parker 3.  Blade size: #3  ETT size (mm): 7.0  Cormack-Lehane Classification: grade I - full view of glottis  Placement verified by: capnometry   Measured from: lips  ETT to lips (cm): 21  Number of attempts at approach: 1  Ventilation between attempts: none  Number of other approaches attempted: 0

## 2024-11-09 NOTE — PROGRESS NOTES
Marita Durand is a 79 y.o. female on day 1 of admission presenting with Syncope, unspecified syncope type.      Subjective   Hemodynamically stable, in no acute distress    Objective     Last Recorded Vitals  BP (!) 189/54   Pulse 75   Temp 36.2 °C (97.2 °F) (Temporal)   Resp 17   Wt 48 kg (105 lb 13.1 oz)   SpO2 97%   Intake/Output last 3 Shifts:    Intake/Output Summary (Last 24 hours) at 11/9/2024 1133  Last data filed at 11/9/2024 1007  Gross per 24 hour   Intake 50 ml   Output --   Net 50 ml       Admission Weight  Weight: 59 kg (130 lb) (11/08/24 1731)    Daily Weight  11/09/24 : 48 kg (105 lb 13.1 oz)    Image Results  ECG 12 lead  Normal sinus rhythm  Normal ECG  When compared with ECG of 25-MAY-2017 09:37,  No significant change was found  Confirmed by Bienvenido Elizabeth ([id number]) on 11/9/2024 10:11:21 AM  Transthoracic Echo (TTE) [place] [id number]   Tel [phone] Fax [phone]    TRANSTHORACIC ECHOCARDIOGRAM REPORT    Patient Name:       MARITA DURAND    Reading Physician:    [id number] Bienvenido Elizabeth DO  Study Date:         11/9/2024            Ordering Provider:    [id number] LUDWIN REICH  MRN/PID:            [id number]             Fellow:  Accession#:         [id number]         Nurse:  Date of Birth/Age:  1945 / 79 years Sonographer:          Daylin STALLWORTH  Gender Assigned at  F                    Additional Staff:  Birth:  Height:             157.48 cm            Admit Date:           11/8/2024  Weight:             47.63 kg             Admission Status:     Inpatient - STAT  BSA / BMI:          1.45 m2 / 19.20      Department Location:  [address] [place] [address]/m2  Blood Pressure: 182 /78 mmHg    Study Type:     TRANSTHORACIC ECHO (TTE) COMPLETE  Diagnosis/ICD: Encounter for other preprocedural examination-Z01.818  Indication:    Pre-Op Evaluation  CPT Codes:     Echo Complete w Full Doppler-[id number]   Study Detail: The following Echo studies were performed: 2D, M-Mode, Doppler and                color flow. Technically challenging study due to patient lying in                supine position. The patient was awake.       PHYSICIAN INTERPRETATION:  Left Ventricle: Left ventricular ejection fraction is normal, by visual estimate at 60%. There are no regional wall motion abnormalities. The left ventricular cavity size is normal. There is normal septal and normal posterior left ventricular wall thickness. There is left ventricular concentric remodeling. Spectral Doppler shows a Grade I (impaired relaxation pattern) of left ventricular diastolic filling with normal left atrial filling pressure.  LV Wall Scoring:  All segments are normal.    Left Atrium: The left atrium is normal in size.  Right Ventricle: The right ventricle is normal in size. There is normal right ventricular global systolic function.  Right Atrium: The right atrium is normal in size.  Aortic Valve: The aortic valve appears structurally normal. There is no evidence of aortic valve stenosis.  The aortic valve dimensionless index is 0.85. There is no evidence of aortic valve regurgitation. The peak instantaneous gradient of the aortic valve is 7 mmHg. The mean gradient of the aortic valve is 4 mmHg.  Mitral Valve: The mitral valve is normal in structure. There is mild thickening and calcification of the anterior and posterior mitral valve leaflets. There is no evidence of mitral valve stenosis. There is normal mitral valve leaflet mobility. There is no evidence of mitral valve regurgitation.  Tricuspid Valve: The tricuspid valve is structurally normal. There is normal tricuspid valve leaflet mobility. There is mild tricuspid regurgitation.  Pulmonic Valve: The  pulmonic valve is structurally normal. There is trace pulmonic valve regurgitation.  Pericardium: No pericardial effusion noted.  Aorta: The aortic root is normal.  Pulmonary Artery: The main pulmonary artery is normal in size, and position, with normal bifurcation into the left and right pulmonary arteries. The tricuspid regurgitant velocity is 2.99 m/s, and with an estimated right atrial pressure of 3 mmHg, the estimated pulmonary artery pressure is mild to moderately elevated with the RVSP at 38.8 mmHg.  Systemic Veins: The inferior vena cava appears normal in size.  In comparison to the previous echocardiogram(s): There are no prior studies on this patient for comparison purposes.       CONCLUSIONS:   1. Left ventricular ejection fraction is normal, by visual estimate at 60%.   2. There is normal right ventricular global systolic function.   3. There is no evidence of mitral valve stenosis.   4. No evidence of mitral valve regurgitation.   5. Aortic valve stenosis is not present.   6. Mild to moderately elevated pulmonary artery pressure.   7. The main pulmonary artery is normal in size, and position, with normal bifurcation into the left and right pulmonary arteries.    RECOMMENDATIONS:  Technically suboptimal and limited study, therefore accuracy of above interpretation could be substantially diminished. Clinical correlation is advised. Consider additional imaging modalities if clinically indicated.       QUANTITATIVE DATA SUMMARY:     2D MEASUREMENTS:           Normal Ranges:  Ao Root d:       2.50 cm   (2.0-3.7cm)  LAs:             2.50 cm   (2.7-4.0cm)  IVSd:            0.70 cm   (0.6-1.1cm)  LVPWd:           0.70 cm   (0.6-1.1cm)  LVIDd:           3.20 cm   (3.9-5.9cm)  LVIDs:           2.30 cm  LV Mass Index:   37.4 g/m2  LV % FS          28.1 %       LA VOLUME:                    Normal Ranges:  LA Vol A4C:        20.8 ml    (22+/-6mL/m2)  LA Vol A2C:        19.8 ml  LA Vol BP:         20.6 ml  LA Vol  Index A4C:  14.3ml/m2  LA Vol Index A2C:  13.6 ml/m2  LA Vol Index BP:   14.1 ml/m2  LA Area A4C:       10.4 cm2  LA Area A2C:       10.3 cm2  LA Major Axis A4C: 4.4 cm  LA Major Axis A2C: 4.6 cm  LA Volume Index:   13.3 ml/m2       RA VOLUME BY A/L METHOD:            Normal Ranges:  RA Vol A4C:              22.6 ml    (8.3-19.5ml)  RA Vol Index A4C:        15.5 ml/m2  RA Area A4C:             10.5 cm2  RA Major Axis A4C:       4.2 cm       AORTA MEASUREMENTS:         Normal Ranges:  Asc Ao, d:          2.30 cm (2.1-3.4cm)       LV SYSTOLIC FUNCTION BY 2D PLANIMETRY (MOD):                       Normal Ranges:  EF-A4C View:    61 % (>=55%)  EF-A2C View:    58 %  EF-Biplane:     58 %  EF-Visual:      60 %  LV EF Reported: 60 %       LV DIASTOLIC FUNCTION:             Normal Ranges:  MV Peak E:             0.74 m/s    (0.7-1.2 m/s)  MV Peak A:             0.94 m/s    (0.42-0.7 m/s)  E/A Ratio:             0.79        (1.0-2.2)  MV e'                  0.097 m/s   (>8.0)  MV lateral e'          0.10 m/s  MV medial e'           0.09 m/s  E/e' Ratio:            7.57        (<8.0)  PulmV Sys Shar:         48.40 cm/s  PulmV Tucker Shar:        40.30 cm/s  PulmV S/D Shar:         1.20  PulmV A Revs Shar:      38.60 cm/s  PulmV A Revs Dur:      137.00 msec       MITRAL VALVE:          Normal Ranges:  MV DT:        262 msec (150-240msec)       AORTIC VALVE:                     Normal Ranges:  AoV Vmax:                1.34 m/s (<=1.7m/s)  AoV Peak P.2 mmHg (<20mmHg)  AoV Mean P.0 mmHg (1.7-11.5mmHg)  LVOT Max Shar:            1.02 m/s (<=1.1m/s)  AoV VTI:                 27.40 cm (18-25cm)  LVOT VTI:                23.30 cm  LVOT Diameter:           1.90 cm  (1.8-2.4cm)  AoV Area, VTI:           2.41 cm2 (2.5-5.5cm2)  AoV Area,Vmax:           2.16 cm2 (2.5-4.5cm2)  AoV Dimensionless Index: 0.85       RIGHT VENTRICLE:  RV Basal 3.00 cm  RV Mid   2.60 cm  RV Major 6.2 cm  TAPSE:   16.1 mm  RV s'    0.13  m/s       TRICUSPID VALVE/RVSP:          Normal Ranges:  Peak TR Velocity:     2.99 m/s  RV Syst Pressure:     39 mmHg  (< 30mmHg)  IVC Diam:             0.70 cm       PULMONIC VALVE:          Normal Ranges:  PV Accel Time:  50 msec  (>120ms)  PV Max Shar:     1.0 m/s  (0.6-0.9m/s)  PV Max PG:      3.8 mmHg       Pulmonary Veins:  PulmV A Revs Dur: 137.00 msec  PulmV A Revs Shar: 38.60 cm/s  PulmV Tucker Shar:   40.30 cm/s  PulmV S/D Shar:    1.20  PulmV Sys Shar:    48.40 cm/s       [id number] Bienvenido Clara DO  Electronically signed on 11/9/2024 at 9:36:37 AM       Wall Scoring       ** Final **      Physical Exam      General: Well-developed female, in no acute distress  HEENT: AT, NC, no JVD, no lymphadenopathy, neck supple, hearing issues  Lungs: Clear, no wheezing, no crackles  Cardiac: Normal S1-S2, no murmur, no gallop  Abdomen: Soft, nontender, no distention, positive bowel sound  Extremities: no edema, pulses intact, ROM limited due to fracture of hip, left lower extremity shortened and rotated consistent with fracture  Neurological: Alert awake, sensation intact, oriented x1-2      Assessment & Plan  Syncope, unspecified syncope type    Closed left hip fracture    Primary hypertension    Left displaced femoral neck fracture      Marita Aquino is a 79 y.o. female with past medical history of compression fracture T4 [2011], HTN, does not appear to be following with any PCP who presented s/p fall likely in the setting of syncope resulting in left hip fracture.     #Left hip fracture secondary to fall  #Questionable syncope associated with fall  #History of compression fracture of T4    Fall precaution, pain management, antiemetic as needed  Cardiology recs appreciated for preop clearance  Ortho recs appreciated, status post hip hemiarthroplasty  Continue with Lovenox subcu for VTE prophylaxis postprocedure  Echo done and reviewed: LVEF 60%, elevated pulmonary artery pressure  Will provide orthostatic vitals to rule  out dehydration  Bilateral carotid ultrasound  Telemetry monitor  Check TSH  Leukocytosis likely reactive, will monitor CBC  VTE prophylaxis Lovenox subcu  Continue with lisinopril and IV hydralazine for blood pressure control  Disposition: Pending PT/OT evaluation      Ana Vasquez MD

## 2024-11-10 VITALS
OXYGEN SATURATION: 95 % | SYSTOLIC BLOOD PRESSURE: 139 MMHG | TEMPERATURE: 97.2 F | WEIGHT: 105.82 LBS | DIASTOLIC BLOOD PRESSURE: 65 MMHG | BODY MASS INDEX: 19.47 KG/M2 | RESPIRATION RATE: 16 BRPM | HEART RATE: 89 BPM | HEIGHT: 62 IN

## 2024-11-10 LAB
ANION GAP SERPL CALC-SCNC: 11 MMOL/L (ref 10–20)
BUN SERPL-MCNC: 13 MG/DL (ref 6–23)
CALCIUM SERPL-MCNC: 8 MG/DL (ref 8.6–10.3)
CHLORIDE SERPL-SCNC: 104 MMOL/L (ref 98–107)
CO2 SERPL-SCNC: 26 MMOL/L (ref 21–32)
CREAT SERPL-MCNC: 1.06 MG/DL (ref 0.5–1.05)
EGFRCR SERPLBLD CKD-EPI 2021: 54 ML/MIN/1.73M*2
ERYTHROCYTE [DISTWIDTH] IN BLOOD BY AUTOMATED COUNT: 13.2 % (ref 11.5–14.5)
GLUCOSE SERPL-MCNC: 97 MG/DL (ref 74–99)
HCT VFR BLD AUTO: 35.4 % (ref 36–46)
HGB BLD-MCNC: 11.4 G/DL (ref 12–16)
LACTATE SERPL-SCNC: 1.8 MMOL/L (ref 0.4–2)
MAGNESIUM SERPL-MCNC: 2.06 MG/DL (ref 1.6–2.4)
MCH RBC QN AUTO: 30.3 PG (ref 26–34)
MCHC RBC AUTO-ENTMCNC: 32.2 G/DL (ref 32–36)
MCV RBC AUTO: 94 FL (ref 80–100)
NRBC BLD-RTO: 0 /100 WBCS (ref 0–0)
PLATELET # BLD AUTO: 199 X10*3/UL (ref 150–450)
POTASSIUM SERPL-SCNC: 3.9 MMOL/L (ref 3.5–5.3)
RBC # BLD AUTO: 3.76 X10*6/UL (ref 4–5.2)
SODIUM SERPL-SCNC: 137 MMOL/L (ref 136–145)
WBC # BLD AUTO: 13.1 X10*3/UL (ref 4.4–11.3)

## 2024-11-10 PROCEDURE — 97162 PT EVAL MOD COMPLEX 30 MIN: CPT | Mod: GP

## 2024-11-10 PROCEDURE — 85027 COMPLETE CBC AUTOMATED: CPT | Performed by: STUDENT IN AN ORGANIZED HEALTH CARE EDUCATION/TRAINING PROGRAM

## 2024-11-10 PROCEDURE — 99233 SBSQ HOSP IP/OBS HIGH 50: CPT | Performed by: INTERNAL MEDICINE

## 2024-11-10 PROCEDURE — 2500000002 HC RX 250 W HCPCS SELF ADMINISTERED DRUGS (ALT 637 FOR MEDICARE OP, ALT 636 FOR OP/ED)

## 2024-11-10 PROCEDURE — 80048 BASIC METABOLIC PNL TOTAL CA: CPT | Performed by: STUDENT IN AN ORGANIZED HEALTH CARE EDUCATION/TRAINING PROGRAM

## 2024-11-10 PROCEDURE — 99232 SBSQ HOSP IP/OBS MODERATE 35: CPT | Performed by: STUDENT IN AN ORGANIZED HEALTH CARE EDUCATION/TRAINING PROGRAM

## 2024-11-10 PROCEDURE — 97165 OT EVAL LOW COMPLEX 30 MIN: CPT | Mod: GO

## 2024-11-10 PROCEDURE — 83605 ASSAY OF LACTIC ACID: CPT | Performed by: STUDENT IN AN ORGANIZED HEALTH CARE EDUCATION/TRAINING PROGRAM

## 2024-11-10 PROCEDURE — 2500000001 HC RX 250 WO HCPCS SELF ADMINISTERED DRUGS (ALT 637 FOR MEDICARE OP): Performed by: STUDENT IN AN ORGANIZED HEALTH CARE EDUCATION/TRAINING PROGRAM

## 2024-11-10 PROCEDURE — 83735 ASSAY OF MAGNESIUM: CPT | Performed by: ORTHOPAEDIC SURGERY

## 2024-11-10 PROCEDURE — 36415 COLL VENOUS BLD VENIPUNCTURE: CPT | Performed by: STUDENT IN AN ORGANIZED HEALTH CARE EDUCATION/TRAINING PROGRAM

## 2024-11-10 PROCEDURE — 2500000001 HC RX 250 WO HCPCS SELF ADMINISTERED DRUGS (ALT 637 FOR MEDICARE OP): Performed by: ORTHOPAEDIC SURGERY

## 2024-11-10 PROCEDURE — 1200000002 HC GENERAL ROOM WITH TELEMETRY DAILY

## 2024-11-10 PROCEDURE — 97530 THERAPEUTIC ACTIVITIES: CPT | Mod: GP

## 2024-11-10 PROCEDURE — 2500000004 HC RX 250 GENERAL PHARMACY W/ HCPCS (ALT 636 FOR OP/ED)

## 2024-11-10 RX ORDER — AMLODIPINE BESYLATE 5 MG/1
10 TABLET ORAL DAILY
Status: DISPENSED | OUTPATIENT
Start: 2024-11-10

## 2024-11-10 ASSESSMENT — COGNITIVE AND FUNCTIONAL STATUS - GENERAL
TOILETING: A LITTLE
TOILETING: A LITTLE
MOVING FROM LYING ON BACK TO SITTING ON SIDE OF FLAT BED WITH BEDRAILS: A LITTLE
DAILY ACTIVITIY SCORE: 20
DRESSING REGULAR LOWER BODY CLOTHING: A LITTLE
DAILY ACTIVITIY SCORE: 18
DRESSING REGULAR LOWER BODY CLOTHING: A LOT
DAILY ACTIVITIY SCORE: 20
DRESSING REGULAR UPPER BODY CLOTHING: A LITTLE
DRESSING REGULAR UPPER BODY CLOTHING: A LITTLE
HELP NEEDED FOR BATHING: A LITTLE
MOVING TO AND FROM BED TO CHAIR: A LITTLE
DRESSING REGULAR LOWER BODY CLOTHING: A LITTLE
STANDING UP FROM CHAIR USING ARMS: A LITTLE
WALKING IN HOSPITAL ROOM: A LITTLE
TURNING FROM BACK TO SIDE WHILE IN FLAT BAD: A LOT
CLIMB 3 TO 5 STEPS WITH RAILING: A LOT
WALKING IN HOSPITAL ROOM: A LITTLE
DRESSING REGULAR UPPER BODY CLOTHING: A LITTLE
CLIMB 3 TO 5 STEPS WITH RAILING: A LOT
WALKING IN HOSPITAL ROOM: A LITTLE
MOBILITY SCORE: 15
CLIMB 3 TO 5 STEPS WITH RAILING: TOTAL
TOILETING: A LITTLE
MOBILITY SCORE: 21
HELP NEEDED FOR BATHING: A LOT
HELP NEEDED FOR BATHING: A LITTLE
MOBILITY SCORE: 21

## 2024-11-10 ASSESSMENT — ACTIVITIES OF DAILY LIVING (ADL)
ADL_ASSISTANCE: INDEPENDENT
ADL_ASSISTANCE: INDEPENDENT
BATHING_ASSISTANCE: MODERATE

## 2024-11-10 ASSESSMENT — PAIN SCALES - WONG BAKER
WONGBAKER_NUMERICALRESPONSE: HURTS LITTLE MORE
WONGBAKER_NUMERICALRESPONSE: HURTS WHOLE LOT

## 2024-11-10 ASSESSMENT — PAIN SCALES - GENERAL
PAINLEVEL_OUTOF10: 3
PAINLEVEL_OUTOF10: 1
PAINLEVEL_OUTOF10: 8
PAINLEVEL_OUTOF10: 1

## 2024-11-10 NOTE — PROGRESS NOTES
Physical Therapy    Physical Therapy Evaluation    Patient Name: Marita Aquino  MRN: [id number]  Today's Date: 11/10/2024   Time Calculation  Start Time: 0753  Stop Time: 0822  Time Calculation (min): 29 min  604/604-A    Assessment/Plan   PT Assessment  PT Assessment Results: Decreased strength, Decreased range of motion, Decreased endurance, Impaired balance, Decreased mobility, Decreased safety awareness, Pain, Orthopedic restrictions  Rehab Prognosis: Good  End of Session Communication: Bedside nurse  Assessment Comment: Pt presents with decreased functional mobility secondary to weakness, decreased balance, decreased safety awareness, and new orthopedic restrictions. Pt will benefit from continued PT at a high intensity in order to return to PLOF.  End of Session Patient Position: Up in chair, Alarm on (bilat LE elevated, pillow placed between legs. call light within reach)  IP OR SWING BED PT PLAN  Inpatient or Swing Bed: Inpatient  PT Plan  Treatment/Interventions: Bed mobility, Transfer training, Gait training, Stair training, Balance training, Neuromuscular re-education, Strengthening, Endurance training, Range of motion, Therapeutic exercise, Therapeutic activity, Home exercise program  PT Plan: Ongoing PT  PT Frequency: Daily  PT Discharge Recommendations: High intensity level of continued care  PT Recommended Transfer Status: Assist x1, Assistive device  PT - OK to Discharge: Yes (PT eval complete, ok to d/c once deemed medically appropriate.)    Subjective     Current Problem:  1. Syncope, unspecified syncope type  Transthoracic Echo (TTE) Complete    Transthoracic Echo (TTE) Complete    Carotid duplex bilateral    Carotid duplex bilateral    CANCELED: Transthoracic Echo (TTE) Limited    CANCELED: Transthoracic Echo (TTE) Limited      2. Fall, initial encounter        3. Left displaced femoral neck fracture  Case Request Operating Room: Arthroplasty Partial Hip    Case Request Operating Room:  Arthroplasty Partial Hip      4. Closed fracture of left hip, initial encounter  Operative Images    Operative Images      5. Encounter for other preprocedural examination  Transthoracic Echo (TTE) Complete        Patient Active Problem List   Diagnosis    Closed left hip fracture    Syncope, unspecified syncope type    Left displaced femoral neck fracture    Primary hypertension       General Visit Information:  General  Reason for Referral: recent surgery  Referred By: Ana Vasquez MD  Past Medical History Relevant to Rehab: compression fracture T4 (2011)  Family/Caregiver Present: No  Co-Treatment: OT  Co-Treatment Reason: Maximize pt safety  Prior to Session Communication: Bedside nurse  Patient Position Received: Bed, 3 rail up, Alarm on  General Comment: 78 y/o F presents 11/8 s/p fall to L hip. Pt is s/p L hip nilda (German) on 11/9    Home Living:  Home Living  Type of Home: House  Lives With: Adult children (son who works during daytime)  Home Adaptive Equipment: None  Home Layout: One level  Home Access: Stairs to enter with rails  Entrance Stairs-Number of Steps: 2  Bathroom Shower/Tub: Tub/shower unit  Bathroom Toilet: Standard  Bathroom Equipment: Grab bars in shower    Prior Level of Function:  Prior Function Per Pt/Caregiver Report  Level of [place]: Independent with ADLs and functional transfers  ADL Assistance: Independent  Homemaking Assistance:  (son completes cooking, cleaning, and laundry)  Ambulatory Assistance: Independent (no device use)  Prior Function Comments: Indep with ADLs PTA. Son completes IADLs and drives. No other falls.    Precautions:  Precautions  Hearing/Visual Limitations: severe [place]  LE Weight Bearing Status: Weight Bearing as Tolerated  Medical Precautions: Fall precautions  Post-Surgical Precautions: Left hip precautions (posterior)  Precautions Comment: pt provided visual educatiton sheet with posterior hip precautions, pt verballizes understanding    Vital  Signs:  Vital Signs  SpO2:  (pre 92 post 98 on RA)  Objective     Pain:  Pain Assessment  Pain Assessment: 0-10  0-10 (Numeric) Pain Score: 1  Pain Type: Surgical pain  Pain Location: Hip  Pain Orientation: Left  Pain Interventions: Repositioned    Cognition:  Cognition  Overall Cognitive Status: Within Functional Limits  Orientation Level: Oriented X4    General Assessments:      Activity Tolerance  Endurance: Endurance does not limit participation in activity  Sensation  Light Touch: No apparent deficits  Strength  Strength Comments: RLE MMT 4/5 overall. LLE MMT hip flex NT, knee ext 4-/5, ankle DF 4/5     Coordination  Movements are Fluid and Coordinated: Yes  Postural Control  Postural Control: Within Functional Limits  Static Sitting Balance  Static Sitting-Comment/Number of Minutes: Good  Dynamic Sitting Balance  Dynamic Sitting-Comments: Good  Static Standing Balance  Static Standing-Comment/Number of Minutes: Fair +  Dynamic Standing Balance  Dynamic Standing-Comments: Fair    Functional Assessments:     Bed Mobility  Bed Mobility: Yes  Bed Mobility 1  Bed Mobility 1: Supine to sitting  Level of Assistance 1: Minimum assistance  Bed Mobility Comments 1: HOB elevated, support at LLE and to maintain hip precautions  Transfers  Transfer: Yes  Transfer 1  Technique 1: Sit to stand, Stand to sit  Transfer Device 1:  (FWW)  Transfer Level of Assistance 1: Minimum assistance  Trials/Comments 1: STS to/from bed/toilet and to chair min A overall; cues for hand placement and safety  Ambulation/Gait Training  Ambulation/Gait Training Performed: Yes  Ambulation/Gait Training 1  Surface 1: Level tile  Device 1: Rolling walker  Assistance 1: Minimum assistance  Quality of Gait 1: Inconsistent stride length  Comments/Distance (ft) 1: Pt ambulates ~15' x2 with FWW and min A. cues for ww management and foot positioning.          Extremity/Trunk Assessments:        RLE   RLE : Within Functional Limits  LLE   LLE :  (Hip ROM  at least 0-90 (precautions), knee/ankle WFL)    Outcome Measures:     [facility] Basic Mobility  Turning from your back to your side while in a flat bed without using bedrails: A little  Moving from lying on your back to sitting on the side of a flat bed without using bedrails: A lot  Moving to and from bed to chair (including a wheelchair): A little  Standing up from a chair using your arms (e.g. wheelchair or bedside chair): A little  To walk in hospital room: A little  Climbing 3-5 steps with railing: Total  Basic Mobility - Total Score: 15                                                             Goals:  Encounter Problems       Encounter Problems (Active)       PT Problem       Pt will demonstrate sup > sit and sit > sup bed mobility with CGA (Progressing)       Start:  11/10/24    Expected End:  11/24/24            Pt will demo sit > stand and stand > sit transfer with FWW and CGA  (Progressing)       Start:  11/10/24    Expected End:  11/24/24            Pt will ambulate 50' with FWW and CGA, without LOB  (Progressing)       Start:  11/10/24    Expected End:  11/24/24            Pt will demo up/down 2 steps with handrail and CGA (Progressing)       Start:  11/10/24    Expected End:  11/24/24            Pt will verbalize and demonstrate understanding of posterior hip precautions throughout PT treatment (Progressing)       Start:  11/10/24    Expected End:  11/24/24                   Pain - Adult            Education Documentation  Precautions, taught by Lyssa Infante PT at 11/10/2024 11:46 AM.  Learner: Patient  Readiness: Acceptance  Method: Explanation  Response: Needs Reinforcement  Comment: Educated on PT POC and hip precautions    Mobility Training, taught by Lyssa Infante PT at 11/10/2024 11:46 AM.  Learner: Patient  Readiness: Acceptance  Method: Explanation  Response: Needs Reinforcement  Comment: Educated on PT POC and hip precautions

## 2024-11-10 NOTE — PROGRESS NOTES
Marita Aquino is a 79 y.o. female on day 2 of admission presenting with Syncope, unspecified syncope type.      Subjective   Stable, no acute distress, complaining of hip pain otherwise no more complaints    Objective     Last Recorded Vitals  /72 (BP Location: Right arm, Patient Position: Sitting)   Pulse 89   Temp 35.3 °C (95.5 °F) (Temporal)   Resp 16   Wt 48 kg (105 lb 13.1 oz)   SpO2 100%   Intake/Output last 3 Shifts:    Intake/Output Summary (Last 24 hours) at 11/10/2024 1058  Last data filed at 11/10/2024 0651  Gross per 24 hour   Intake 1000 ml   Output 1350 ml   Net -350 ml       Admission Weight  Weight: 59 kg (130 lb) (11/08/24 1731)    Daily Weight  11/09/24 : 48 kg (105 lb 13.1 oz)      Physical Exam    General: Well-developed female, in no acute distress  HEENT: AT, NC, no JVD, no lymphadenopathy, neck supple, hearing issues  Lungs: Clear, no wheezing, no crackles  Cardiac: Normal S1-S2, no murmur, no gallop  Abdomen: Soft, nontender, no distention, positive bowel sound  Extremities: no edema, pulses intact, ROM limited due to fracture of hip, LLE splinted  Neurological: Alert awake, sensation intact, oriented x3        Assessment & Plan  Syncope, unspecified syncope type    Closed left hip fracture    Primary hypertension    Left displaced femoral neck fracture      Marita Aquino is a 79 y.o. female with past medical history of compression fracture T4 [2011], HTN, does not appear to be following with any PCP who presented s/p fall likely in the setting of syncope resulting in left hip fracture.      #Left hip fracture secondary to fall, status post hip hemiarthroplasty  #Questionable syncope associated with fall  #History of compression fracture of T4  #LAVELL likely prerenal     Fall precaution, pain management, antiemetic as needed  Cardiology signed off  Ortho recs appreciated, status post hip hemiarthroplasty  Continue with Lovenox subcu for VTE prophylaxis postprocedure  Echo  done and reviewed: LVEF 60%, elevated pulmonary artery pressure  Orthostatic pressure normal  Bilateral carotid ultrasound showed mild intimal thickening bilaterally  Continue telemetry monitor  TSH normal  Leukocytosis likely reactive, will monitor CBC  Patient has LAVELL, encouraged oral intake  Monitor renal function, avoid nephrotoxic agent  VTE prophylaxis Lovenox subcu  Continue with lisinopril and IV hydralazine for blood pressure control  Disposition: PT/OT recommended high intensity level on discharge, TCC following for discharge planning      Ana Vasquez MD

## 2024-11-10 NOTE — PROGRESS NOTES
Occupational Therapy    Evaluation    Patient Name: Marita Aquino  MRN: [id number]  Today's Date: 11/10/2024  Time Calculation  Start Time: 0753  Stop Time: 0822  Time Calculation (min): 29 min  604/604-A    Assessment  IP OT Assessment  OT Assessment: impaired adls  Evaluation/Treatment Tolerance: Patient tolerated treatment well  Medical Staff Made Aware: Yes  End of Session Communication: Bedside nurse  End of Session Patient Position: Up in chair, Alarm on (bilat LE elevated, pillow placed between legs. call light within reach)    Plan:  Treatment Interventions: ADL retraining, Functional transfer training, Endurance training, UE strengthening/ROM, Equipment evaluation/education, Patient/family training, Compensatory technique education  OT Frequency: 3 times per week  OT Discharge Recommendations: High intensity level of continued care  OT - OK to Discharge: Yes (once medically appropriate)    Subjective     Current Problem:  1. Syncope, unspecified syncope type  Transthoracic Echo (TTE) Complete    Transthoracic Echo (TTE) Complete    Carotid duplex bilateral    Carotid duplex bilateral    CANCELED: Transthoracic Echo (TTE) Limited    CANCELED: Transthoracic Echo (TTE) Limited      2. Fall, initial encounter        3. Left displaced femoral neck fracture  Case Request Operating Room: Arthroplasty Partial Hip    Case Request Operating Room: Arthroplasty Partial Hip      4. Closed fracture of left hip, initial encounter  Operative Images    Operative Images      5. Encounter for other preprocedural examination  Transthoracic Echo (TTE) Complete          General:  General  Reason for Referral: OT eval and treat  Referred By: Ana Vasquez MD  Past Medical History Relevant to Rehab: compression fracture T4 (2011)  Family/Caregiver Present: No  Co-Treatment: PT  Prior to Session Communication: Bedside nurse  Patient Position Received: Bed, 3 rail up, Alarm on  General Comment: 80 y/o F presents 11/8 s/p  fall to L hip. Pt is s/p L hip nilda ([place]) on 11/9    Precautions:  Hearing/Visual Limitations: severe [place]  LE Weight Bearing Status: Weight Bearing as Tolerated  Medical Precautions: Fall precautions  Post-Surgical Precautions: Left hip precautions (posterior)  Precautions Comment: pt provided visual educatiton sheet with posterior hip precautions, pt verballizes understanding    Vital Signs:  SpO2:  (pre 92 post 98)    Pain:  Pain Assessment  Pain Assessment: 0-10  0-10 (Numeric) Pain Score: 1  Pain Type: Surgical pain  Pain Location: Hip  Pain Orientation: Left  Pain Interventions: Repositioned, Emotional support    Objective     Cognition:  Overall Cognitive Status: Within Functional Limits             Home Living:  Type of Home: House  Lives With: Adult children (son who works during daytime)  Home Adaptive Equipment: None  Home Layout: One level  Home Access: Stairs to enter with rails  Entrance Stairs-Number of Steps: 2  Bathroom Shower/Tub: Tub/shower unit  Bathroom Toilet: Standard  Bathroom Equipment: Grab bars in shower     Prior Function:  Level of [place]: Independent with ADLs and functional transfers  ADL Assistance: Independent  Homemaking Assistance:  (son completes)  Ambulatory Assistance: Independent  Prior Function Comments: Indep with ADLs PTA. Son completes IADLs and drives. No other falls.    ADL:  Eating Assistance: Independent  Grooming Assistance: Stand by  Grooming Deficit: Steadying, Verbal cueing, Supervision/safety  Bathing Assistance: Moderate  Bathing Deficit: Setup, Increased time to complete , Use of adaptive equipment  UE Dressing Assistance: Minimal  LE Dressing Assistance: Maximal  LE Dressing Deficit: Verbal cueing, Supervision/safety, Use of adaptive equipment  Toileting Assistance with Device: Minimal    Activity Tolerance:  Endurance: Endurance does not limit participation in activity    Bed Mobility/Transfers:   Bed Mobility  Bed Mobility: Yes  Bed Mobility 1  Bed  Mobility 1: Supine to sitting  Level of Assistance 1: Minimum assistance  Bed Mobility Comments 1: HOB elevated, support at LLE  Transfers  Transfer: Yes  Transfer 1  Technique 1: Sit to stand, Stand to sit  Transfer Device 1: Walker  Transfer Level of Assistance 1: Minimum assistance  Trials/Comments 1: STS to/from bed/toilet and to chair min A overall; cues for hand placement and safety    Ambulation/Gait Training:  Functional Mobility  Functional Mobility Performed: Yes  Functional Mobility 1  Device 1: Rolling walker  Assistance 1: Minimum assistance  Comments 1: pt completes simple mobility in hospital room and bathroom using FWW and LUIS overall for safety cues while using walker      Standing Balance:  Static Standing Balance  Static Standing-Comment/Number of Minutes: fair-  Dynamic Standing Balance  Dynamic Standing-Comments: fair-    Vision: Vision - Basic Assessment  Current Vision: No visual deficits   and      Sensation:  Light Touch: No apparent deficits    Strength:  Strength Comments: bilat UE WFL        Extremities: RUE   RUE : Within Functional Limits and LUE   LUE: Within Functional Limits    Outcome Measures: [facility] Daily Activity  Putting on and taking off regular lower body clothing: A lot  Bathing (including washing, rinsing, drying): A lot  Putting on and taking off regular upper body clothing: A little  Toileting, which includes using toilet, bedpan or urinal: A little  Taking care of personal grooming such as brushing teeth: None  Eating Meals: None  Daily Activity - Total Score: 18                       EDUCATION:     Education Documentation  Body Mechanics, taught by Thuy Simmons OT at 11/10/2024 10:39 AM.  Learner: Patient  Readiness: Acceptance  Method: Explanation  Response: Verbalizes Understanding    Precautions, taught by Thuy Simmons OT at 11/10/2024 10:39 AM.  Learner: Patient  Readiness: Acceptance  Method: Explanation  Response: Verbalizes Understanding    ADL Training,  taught by Thuy Simmons OT at 11/10/2024 10:39 AM.  Learner: Patient  Readiness: Acceptance  Method: Explanation  Response: Verbalizes Understanding    Education Comments  No comments found.        Goals:   Encounter Problems       Encounter Problems (Active)       OT Goals       Mod I for all functional transfers (Progressing)       Start:  11/10/24    Expected End:  11/24/24            Mod I for simulated HH distances (Progressing)       Start:  11/10/24    Expected End:  11/24/24            Pt will complete upper and lower body bathing/dressing; toileting with modified independence using adaptive equipment as needed    (Progressing)       Start:  11/10/24    Expected End:  11/24/24            Pt will demo fair + dynamic standing balance during all ADLs (Progressing)       Start:  11/10/24    Expected End:  11/24/24

## 2024-11-10 NOTE — PROGRESS NOTES
Hip surgery    Progress Note    Subjective:     Post-Operative Day: 1 Status Post left Hip Arthroplasty  Systemic or Specific Complaints:No Complaints patient sitting up in chair comfortable    Objective:     Visit Vitals  /72 (BP Location: Right arm, Patient Position: Sitting)   Pulse 89   Temp 35.3 °C (95.5 °F) (Temporal)   Resp 16       General: alert, appears stated age, and cooperative   Wound: wound clean and dry no evidence of infection   Motion: Painful range of Motion   DVT Exam: No evidence of DVT seen on physical exam.       NVI in lower extremity. Thigh swollen but soft. Moving foot and ankle.      Data Review  Recent Results (from the past 24 hours)   Lactate    Collection Time: 11/09/24  5:38 PM   Result Value Ref Range    Lactate 5.3 (HH) 0.4 - 2.0 mmol/L   Lactate    Collection Time: 11/09/24 10:05 PM   Result Value Ref Range    Lactate 3.4 (H) 0.4 - 2.0 mmol/L   Magnesium    Collection Time: 11/10/24  6:21 AM   Result Value Ref Range    Magnesium 2.06 1.60 - 2.40 mg/dL   CBC    Collection Time: 11/10/24  6:21 AM   Result Value Ref Range    WBC 13.1 (H) 4.4 - 11.3 x10*3/uL    nRBC 0.0 0.0 - 0.0 /100 WBCs    RBC 3.76 (L) 4.00 - 5.20 x10*6/uL    Hemoglobin 11.4 (L) 12.0 - 16.0 g/dL    Hematocrit 35.4 (L) 36.0 - 46.0 %    MCV 94 80 - 100 fL    MCH 30.3 26.0 - 34.0 pg    MCHC 32.2 32.0 - 36.0 g/dL    RDW 13.2 11.5 - 14.5 %    Platelets 199 150 - 450 x10*3/uL   Basic Metabolic Panel    Collection Time: 11/10/24  6:21 AM   Result Value Ref Range    Glucose 97 74 - 99 mg/dL    Sodium 137 136 - 145 mmol/L    Potassium 3.9 3.5 - 5.3 mmol/L    Chloride 104 98 - 107 mmol/L    Bicarbonate 26 21 - 32 mmol/L    Anion Gap 11 10 - 20 mmol/L    Urea Nitrogen 13 6 - 23 mg/dL    Creatinine 1.06 (H) 0.50 - 1.05 mg/dL    eGFR 54 (L) >60 mL/min/1.73m*2    Calcium 8.0 (L) 8.6 - 10.3 mg/dL   Lactate    Collection Time: 11/10/24  6:21 AM   Result Value Ref Range    Lactate 1.8 0.4 - 2.0 mmol/L     Carotid duplex  bilateral    Result Date: 11/10/2024  Interpreted By:  Bisi Stein, STUDY: [id number]; 11/9/2024 2:39 pm   INDICATION: Signs/Symptoms:syncope :   COMPARISON: None.   ACCESSION NUMBER(S): [id number]   ORDERING CLINICIAN: ABE HORN   TECHNIQUE: Carotid Examination using B-mode, color flow and doppler spectral analysis.   FINDINGS: RIGHT CAROTID SYSTEM DCCA PSV/EDV: 85.6 cm/s / 18.1 cm/s ECA PSV: 119.9 cm/s PICA PSV/EDV: 75.6 / 21.5 cm/sec JENNIFFER PSV/EDV: 90.8 cm/s / 27.1 cm/s DICA PSV/EDV: 145.7 cm/s / 34.6 cm/s ZAID/VCC Ratio: 1.7 VERT : Antegrade   LEFT CAROTID SYSTEM DCCA PSV/EDV: 91.3 cm/s / 17.2 cm/s ECA PSV: 145.3 cm/s PICA PSV/EDV: 71.3 cm/s / 8.5 cm/s JENNIFFER PSV/EDV: 77.8cm/s/12.8 cm/sec DICA PSV/EDV: 114.7 cm/s/21.5 cm/s ZAID/VCC Ratio: 1.3 VERT : Antegrade   DUPLEX IMAGES: Right Carotid System: Mild intimal thickening is noted.   Left Carotid System: Mild intimal thickening is present.   Interpretation is based upon the IAC recommendations which are the 2021 modified diagnostic criteria adopted from the 2003 Society of Radiologist and Ultrasound Consensus Conference.       Mild intimal thickening noted bilaterally without plaque formation or stenosis affecting either internal carotid artery   Antegrade flow in both vertebral arteries.   MACRO: None.   Signed by: Bisi Stein 11/10/2024 9:26 AM Dictation workstation:   [id number]    XR hip left with pelvis when performed 1 view    Result Date: 11/10/2024  Interpreted By:  Bisi Stein, STUDY: XR HIP LEFT WITH PELVIS WHEN PERFORMED 1 VIEW 11/9/2024 1:52 pm   INDICATION: Signs/Symptoms:post op, asymptomatic   COMPARISON: 11/08/2024   ACCESSION NUMBER(S): [id number]   ORDERING CLINICIAN: LUCY HULL   TECHNIQUE: A single portable view of the left hip is completed.   FINDINGS: There is postoperative change from left hip arthroplasty with no acute bony abnormality identified. Prosthesis is well seated within the native acetabulum.       Satisfactory  postoperative appearance left hip arthroplasty.   Signed by: Bisi Stein 11/10/2024 6:32 AM Dictation workstation:   [id number]    Operative Images    Result Date: 11/9/2024  This study is a surgery completed in an invasive cardiovascular space. Please see OpNote on Notes tab for findings.    ECG 12 lead    Result Date: 11/9/2024  Normal sinus rhythm Normal ECG When compared with ECG of 25-MAY-2017 09:37, No significant change was found Confirmed by Bienvenido Elizabeth ([id number]) on 11/9/2024 10:11:21 AM    Transthoracic Echo (TTE) Complete    Result Date: 11/9/2024          [place] [id number]  Tel [phone] Fax [phone] TRANSTHORACIC ECHOCARDIOGRAM REPORT Patient Name:       HALEIGH ROYAL KIZZY    Reading Physician:    [id number] Bienvenido Elizabeth DO Study Date:         11/9/2024            Ordering Provider:    [id number] LUDWIN REICH MRN/PID:            [id number]             Fellow: Accession#:         [id number]         Nurse: Date of Birth/Age:  1945 / 79 years Sonographer:          Daylin STALLWORTH Gender Assigned at  F                    Additional Staff: Birth: Height:             157.48 cm            Admit Date:           11/8/2024 Weight:             47.63 kg             Admission Status:     Inpatient - STAT BSA / BMI:          1.45 m2 / 19.20      Department Location:  [address] [place] [address]                     kg/m2 Blood Pressure: 182 /78 mmHg Study Type:    TRANSTHORACIC ECHO (TTE) COMPLETE Diagnosis/ICD: Encounter for other preprocedural examination-Z01.818 Indication:    Pre-Op Evaluation CPT Codes:     Echo Complete w Full Doppler-[id number]  Study Detail: The following Echo studies were performed: 2D, M-Mode, Doppler and               color flow. Technically challenging study due to  patient lying in               supine position. The patient was awake.  PHYSICIAN INTERPRETATION: Left Ventricle: Left ventricular ejection fraction is normal, by visual estimate at 60%. There are no regional wall motion abnormalities. The left ventricular cavity size is normal. There is normal septal and normal posterior left ventricular wall thickness. There is left ventricular concentric remodeling. Spectral Doppler shows a Grade I (impaired relaxation pattern) of left ventricular diastolic filling with normal left atrial filling pressure. LV Wall Scoring: All segments are normal. Left Atrium: The left atrium is normal in size. Right Ventricle: The right ventricle is normal in size. There is normal right ventricular global systolic function. Right Atrium: The right atrium is normal in size. Aortic Valve: The aortic valve appears structurally normal. There is no evidence of aortic valve stenosis. The aortic valve dimensionless index is 0.85. There is no evidence of aortic valve regurgitation. The peak instantaneous gradient of the aortic valve is 7 mmHg. The mean gradient of the aortic valve is 4 mmHg. Mitral Valve: The mitral valve is normal in structure. There is mild thickening and calcification of the anterior and posterior mitral valve leaflets. There is no evidence of mitral valve stenosis. There is normal mitral valve leaflet mobility. There is no evidence of mitral valve regurgitation. Tricuspid Valve: The tricuspid valve is structurally normal. There is normal tricuspid valve leaflet mobility. There is mild tricuspid regurgitation. Pulmonic Valve: The pulmonic valve is structurally normal. There is trace pulmonic valve regurgitation. Pericardium: No pericardial effusion noted. Aorta: The aortic root is normal. Pulmonary Artery: The main pulmonary artery is normal in size, and position, with normal bifurcation into the left and right pulmonary arteries. The tricuspid regurgitant velocity is 2.99 m/s, and  with an estimated right atrial pressure of 3 mmHg, the estimated pulmonary artery pressure is mild to moderately elevated with the RVSP at 38.8 mmHg. Systemic Veins: The inferior vena cava appears normal in size. In comparison to the previous echocardiogram(s): There are no prior studies on this patient for comparison purposes.  CONCLUSIONS:  1. Left ventricular ejection fraction is normal, by visual estimate at 60%.  2. There is normal right ventricular global systolic function.  3. There is no evidence of mitral valve stenosis.  4. No evidence of mitral valve regurgitation.  5. Aortic valve stenosis is not present.  6. Mild to moderately elevated pulmonary artery pressure.  7. The main pulmonary artery is normal in size, and position, with normal bifurcation into the left and right pulmonary arteries. RECOMMENDATIONS: Technically suboptimal and limited study, therefore accuracy of above interpretation could be substantially diminished. Clinical correlation is advised. Consider additional imaging modalities if clinically indicated.  QUANTITATIVE DATA SUMMARY:  2D MEASUREMENTS:           Normal Ranges: Ao Root d:       2.50 cm   (2.0-3.7cm) LAs:             2.50 cm   (2.7-4.0cm) IVSd:            0.70 cm   (0.6-1.1cm) LVPWd:           0.70 cm   (0.6-1.1cm) LVIDd:           3.20 cm   (3.9-5.9cm) LVIDs:           2.30 cm LV Mass Index:   37.4 g/m2 LV % FS          28.1 %  LA VOLUME:                    Normal Ranges: LA Vol A4C:        20.8 ml    (22+/-6mL/m2) LA Vol A2C:        19.8 ml LA Vol BP:         20.6 ml LA Vol Index A4C:  14.3ml/m2 LA Vol Index A2C:  13.6 ml/m2 LA Vol Index BP:   14.1 ml/m2 LA Area A4C:       10.4 cm2 LA Area A2C:       10.3 cm2 LA Major Axis A4C: 4.4 cm LA Major Axis A2C: 4.6 cm LA Volume Index:   13.3 ml/m2  RA VOLUME BY A/L METHOD:            Normal Ranges: RA Vol A4C:              22.6 ml    (8.3-19.5ml) RA Vol Index A4C:        15.5 ml/m2 RA Area A4C:             10.5 cm2 RA Major Axis  A4C:       4.2 cm  AORTA MEASUREMENTS:         Normal Ranges: Asc Ao, d:          2.30 cm (2.1-3.4cm)  LV SYSTOLIC FUNCTION BY 2D PLANIMETRY (MOD):                      Normal Ranges: EF-A4C View:    61 % (>=55%) EF-A2C View:    58 % EF-Biplane:     58 % EF-Visual:      60 % LV EF Reported: 60 %  LV DIASTOLIC FUNCTION:             Normal Ranges: MV Peak E:             0.74 m/s    (0.7-1.2 m/s) MV Peak A:             0.94 m/s    (0.42-0.7 m/s) E/A Ratio:             0.79        (1.0-2.2) MV e'                  0.097 m/s   (>8.0) MV lateral e'          0.10 m/s MV medial e'           0.09 m/s E/e' Ratio:            7.57        (<8.0) PulmV Sys Shar:         48.40 cm/s PulmV Tucker Shar:        40.30 cm/s PulmV S/D Shar:         1.20 PulmV A Revs Shar:      38.60 cm/s PulmV A Revs Dur:      137.00 msec  MITRAL VALVE:          Normal Ranges: MV DT:        262 msec (150-240msec)  AORTIC VALVE:                     Normal Ranges: AoV Vmax:                1.34 m/s (<=1.7m/s) AoV Peak P.2 mmHg (<20mmHg) AoV Mean P.0 mmHg (1.7-11.5mmHg) LVOT Max Shar:            1.02 m/s (<=1.1m/s) AoV VTI:                 27.40 cm (18-25cm) LVOT VTI:                23.30 cm LVOT Diameter:           1.90 cm  (1.8-2.4cm) AoV Area, VTI:           2.41 cm2 (2.5-5.5cm2) AoV Area,Vmax:           2.16 cm2 (2.5-4.5cm2) AoV Dimensionless Index: 0.85  RIGHT VENTRICLE: RV Basal 3.00 cm RV Mid   2.60 cm RV Major 6.2 cm TAPSE:   16.1 mm RV s'    0.13 m/s  TRICUSPID VALVE/RVSP:          Normal Ranges: Peak TR Velocity:     2.99 m/s RV Syst Pressure:     39 mmHg  (< 30mmHg) IVC Diam:             0.70 cm  PULMONIC VALVE:          Normal Ranges: PV Accel Time:  50 msec  (>120ms) PV Max Shar:     1.0 m/s  (0.6-0.9m/s) PV Max PG:      3.8 mmHg  Pulmonary Veins: PulmV A Revs Dur: 137.00 msec PulmV A Revs Shar: 38.60 cm/s PulmV Tucker Shar:   40.30 cm/s PulmV S/D Shar:    1.20 PulmV Sys Shar:    48.40 cm/s  [id number] Bienvenido Elizabeth DO  Electronically signed on 11/9/2024 at 9:36:37 AM  Wall Scoring  ** Final **     XR hip left with pelvis when performed 2 or 3 views    Addendum Date: 11/8/2024    Interpreted By:  Lucy Wilburn, ADDENDUM: Study: Pelvis one view Left hip, two views.   Signed by: Lucy Wilburn 11/8/2024 7:38 PM   -------- ORIGINAL REPORT -------- Dictation workstation:   [id number]    Result Date: 11/8/2024  Interpreted By:  Lucy Wilburn, STUDY: Left hip, two views   INDICATION: Signs/Symptoms:fall, left hip pain.   COMPARISON: None.   ACCESSION NUMBER(S): [id number]   ORDERING CLINICIAN: HUEY NARAYANAN   FINDINGS: There is a nondisplaced mildly impacted subcapital left femoral neck fracture with varus angulation of the femoral shaft. Soft tissues are within normal limits. Left hip joint space is maintained.       1. Subcapital left femoral neck fracture.   MACRO: None.   Signed by: Lucy Wilburn 11/8/2024 6:44 PM Dictation workstation:   [id number]    CT hip left wo IV contrast    Result Date: 11/8/2024  Interpreted By:  Modesto Mak, STUDY: CT HIP LEFT WO IV CONTRAST; CT 3D RECONSTRUCTION;  11/8/2024 6:54 pm   INDICATION: Signs/Symptoms:hip fx; Signs/Symptoms:trauma.     COMPARISON: Same day radiographs   ACCESSION NUMBER(S): [id number]; [id number]   ORDERING CLINICIAN: HUEY NARAYANAN   TECHNIQUE: CT imaging of the left hip was obtained without contrast. Coronal and sagittal reformatted images were performed. 3D reconstructed images were created on an independent workstation and reviewed.   FINDINGS: OSSEOUS STRUCTURES:   There is redemonstration of the displaced, comminuted and slightly impacted left subcapital femoral fracture, with anterior angulation of the fracture fragments. There is a small associated joint effusion with mild edema present in the adjacent musculature.   No other acute bony abnormalities are present in the partially visualized pelvis or sacrum.   SOFT TISSUES:   Mild fat  stranding is present in the cutaneous tissues of the posterior left hip without evidence of fluid collection.   Numerous diverticula are partially visualized in the sigmoid colon without evidence of acute abnormality within the partially visualized abdomen and pelvis.       Displaced, comminuted and slightly impacted left subcapital femoral fracture with anterior angulation of the fracture fragments.   MACRO: None.   Signed by: Modesto Mak 11/8/2024 7:16 PM Dictation workstation:   [id number]    CT 3D reconstruction    Result Date: 11/8/2024  Interpreted By:  Modesto Mak, STUDY: CT HIP LEFT WO IV CONTRAST; CT 3D RECONSTRUCTION;  11/8/2024 6:54 pm   INDICATION: Signs/Symptoms:hip fx; Signs/Symptoms:trauma.     COMPARISON: Same day radiographs   ACCESSION NUMBER(S): [id number]; [id number]   ORDERING CLINICIAN: HUEY NARAYANAN   TECHNIQUE: CT imaging of the left hip was obtained without contrast. Coronal and sagittal reformatted images were performed. 3D reconstructed images were created on an independent workstation and reviewed.   FINDINGS: OSSEOUS STRUCTURES:   There is redemonstration of the displaced, comminuted and slightly impacted left subcapital femoral fracture, with anterior angulation of the fracture fragments. There is a small associated joint effusion with mild edema present in the adjacent musculature.   No other acute bony abnormalities are present in the partially visualized pelvis or sacrum.   SOFT TISSUES:   Mild fat stranding is present in the cutaneous tissues of the posterior left hip without evidence of fluid collection.   Numerous diverticula are partially visualized in the sigmoid colon without evidence of acute abnormality within the partially visualized abdomen and pelvis.       Displaced, comminuted and slightly impacted left subcapital femoral fracture with anterior angulation of the fracture fragments.   MACRO: None.   Signed by: Modesto Mak 11/8/2024  7:16 PM Dictation workstation:   [id number]    CT head wo IV contrast    Result Date: 11/8/2024  Interpreted By:  Moedsto Mak, STUDY: CT HEAD WO IV CONTRAST; CT CERVICAL SPINE WO IV CONTRAST;  11/8/2024 6:53 pm   INDICATION: Signs/Symptoms:syncope, fall; Signs/Symptoms:fall.     COMPARISON: CT head dated 02/03/2012.   ACCESSION NUMBER(S): [id number]; [id number]   ORDERING CLINICIAN: HUEY NARAYANAN   TECHNIQUE: Noncontrast axial CT scan of head was performed, with coronal and sagittal reformats provided. The images were reviewed in bone, brain, blood and soft tissue windows.   Axial CT images of the cervical spine are obtained. Axial, coronal and sagittal reconstructions are provided for review.   FINDINGS: CT HEAD:   No hyperdense intracranial hemorrhage is present. There is no mass effect or midline shift.   Gray-white differentiation is intact, without evidence of CT apparent transcortical infarct. Subtle attenuation changes present in the periventricular and subcortical white matter of bilateral cerebral hemispheres are nonspecific, but favored to represent sequela of microvascular disease.   No abnormal ventricular dilatation is present. Basal cisterns are patent. No extra-axial fluid collection is identified.   Scalp soft tissues do not demonstrate any acute abnormality. No skull fracture or other acute calvarial abnormality is present.   Mastoid air cells and middle ear cavities are clear without evidence of acute abnormality. Visualized paranasal sinuses are unremarkable in appearance.   CT C-SPINE:   Cervical vertebral alignment is maintained, without significant spondylolisthesis.   Cervical vertebral body heights are preserved, without evidence of compression fractures. Posterior elements of the cervical spine do not demonstrate any evidence of acute trauma.   Craniocervical junction is intact. Facet joints are preserved without evidence of subluxation or perching.   No significant  intervertebral disc height loss is present. Several large anterior disc osteophytes are present at the level of C3-C4, C4-C5, C5-C6 and to lesser extent C6-C7.   No high-grade spinal canal stenosis is present. Small disc bulge is evident at the level of C5-C6, with some effacement of the anterior subarachnoid space and mild associated spinal canal narrowing in conjunction with ligamentum flavum thickening.   No significant bony neural foraminal stenosis is identified.   Prevertebral and paraspinal soft tissues are unremarkable in appearance.       CT HEAD:   No evidence of hemorrhage, skull fracture, or other acute intracranial trauma/abnormality.   CT C-SPINE:   No evidence of acute trauma to the cervical spine.   MACRO: None   Signed by: Modesto Mak 11/8/2024 7:12 PM Dictation workstation:   [id number]    CT cervical spine wo IV contrast    Result Date: 11/8/2024  Interpreted By:  Modesto Mak, STUDY: CT HEAD WO IV CONTRAST; CT CERVICAL SPINE WO IV CONTRAST;  11/8/2024 6:53 pm   INDICATION: Signs/Symptoms:syncope, fall; Signs/Symptoms:fall.     COMPARISON: CT head dated 02/03/2012.   ACCESSION NUMBER(S): [id number]; [id number]   ORDERING CLINICIAN: HUEY NARAYANAN   TECHNIQUE: Noncontrast axial CT scan of head was performed, with coronal and sagittal reformats provided. The images were reviewed in bone, brain, blood and soft tissue windows.   Axial CT images of the cervical spine are obtained. Axial, coronal and sagittal reconstructions are provided for review.   FINDINGS: CT HEAD:   No hyperdense intracranial hemorrhage is present. There is no mass effect or midline shift.   Gray-white differentiation is intact, without evidence of CT apparent transcortical infarct. Subtle attenuation changes present in the periventricular and subcortical white matter of bilateral cerebral hemispheres are nonspecific, but favored to represent sequela of microvascular disease.   No abnormal ventricular  dilatation is present. Basal cisterns are patent. No extra-axial fluid collection is identified.   Scalp soft tissues do not demonstrate any acute abnormality. No skull fracture or other acute calvarial abnormality is present.   Mastoid air cells and middle ear cavities are clear without evidence of acute abnormality. Visualized paranasal sinuses are unremarkable in appearance.   CT C-SPINE:   Cervical vertebral alignment is maintained, without significant spondylolisthesis.   Cervical vertebral body heights are preserved, without evidence of compression fractures. Posterior elements of the cervical spine do not demonstrate any evidence of acute trauma.   Craniocervical junction is intact. Facet joints are preserved without evidence of subluxation or perching.   No significant intervertebral disc height loss is present. Several large anterior disc osteophytes are present at the level of C3-C4, C4-C5, C5-C6 and to lesser extent C6-C7.   No high-grade spinal canal stenosis is present. Small disc bulge is evident at the level of C5-C6, with some effacement of the anterior subarachnoid space and mild associated spinal canal narrowing in conjunction with ligamentum flavum thickening.   No significant bony neural foraminal stenosis is identified.   Prevertebral and paraspinal soft tissues are unremarkable in appearance.       CT HEAD:   No evidence of hemorrhage, skull fracture, or other acute intracranial trauma/abnormality.   CT C-SPINE:   No evidence of acute trauma to the cervical spine.   MACRO: None   Signed by: Modesto Mak 11/8/2024 7:12 PM Dictation workstation:   [id number]    XR chest 1 view    Result Date: 11/8/2024  Interpreted By:  Lucy Wilburn, STUDY: Chest, single AP view.   INDICATION: Signs/Symptoms:fall.   COMPARISON: 06/15/2015   ACCESSION NUMBER(S): [id number]   ORDERING CLINICIAN: HUEY NARAYANAN   FINDINGS: The cardiac silhouette size is within normal limits. There is no focal  consolidation, edema or pneumothorax. No sizeable pleural effusion. No acute osseous abnormality.       1. No acute cardiopulmonary process.   MACRO: None.   Signed by: Lucy Wilburn 11/8/2024 6:45 PM Dictation workstation:   [id number]      Assessment:     Status Post left Hip Arthroplasty. Doing well postoperatively.     Plan:      1: Continues current post-op course likely will need rehab placement postoperatively continue current care.:  2:  Continue Deep venous thrombosis prophylaxis  3:  Continue physical therapy  4:  Continue Pain Control

## 2024-11-11 ENCOUNTER — TELEPHONE (OUTPATIENT)
Dept: POST ACUTE CARE | Facility: EXTERNAL LOCATION | Age: 79
End: 2024-11-11
Payer: MEDICARE

## 2024-11-11 VITALS
BODY MASS INDEX: 19.47 KG/M2 | HEIGHT: 62 IN | WEIGHT: 105.82 LBS | TEMPERATURE: 96.6 F | DIASTOLIC BLOOD PRESSURE: 70 MMHG | SYSTOLIC BLOOD PRESSURE: 117 MMHG | RESPIRATION RATE: 16 BRPM | OXYGEN SATURATION: 97 % | HEART RATE: 100 BPM

## 2024-11-11 LAB — MAGNESIUM SERPL-MCNC: 1.94 MG/DL (ref 1.6–2.4)

## 2024-11-11 PROCEDURE — 83735 ASSAY OF MAGNESIUM: CPT | Performed by: ORTHOPAEDIC SURGERY

## 2024-11-11 PROCEDURE — 99239 HOSP IP/OBS DSCHRG MGMT >30: CPT | Performed by: STUDENT IN AN ORGANIZED HEALTH CARE EDUCATION/TRAINING PROGRAM

## 2024-11-11 PROCEDURE — 97530 THERAPEUTIC ACTIVITIES: CPT | Mod: GP,CQ

## 2024-11-11 PROCEDURE — 2500000001 HC RX 250 WO HCPCS SELF ADMINISTERED DRUGS (ALT 637 FOR MEDICARE OP): Performed by: STUDENT IN AN ORGANIZED HEALTH CARE EDUCATION/TRAINING PROGRAM

## 2024-11-11 PROCEDURE — 97116 GAIT TRAINING THERAPY: CPT | Mod: GP,CQ

## 2024-11-11 PROCEDURE — 36415 COLL VENOUS BLD VENIPUNCTURE: CPT | Performed by: ORTHOPAEDIC SURGERY

## 2024-11-11 PROCEDURE — 97530 THERAPEUTIC ACTIVITIES: CPT | Mod: GO,CO

## 2024-11-11 PROCEDURE — 97535 SELF CARE MNGMENT TRAINING: CPT | Mod: GO,CO

## 2024-11-11 PROCEDURE — 2500000004 HC RX 250 GENERAL PHARMACY W/ HCPCS (ALT 636 FOR OP/ED)

## 2024-11-11 PROCEDURE — 2500000001 HC RX 250 WO HCPCS SELF ADMINISTERED DRUGS (ALT 637 FOR MEDICARE OP): Performed by: ORTHOPAEDIC SURGERY

## 2024-11-11 RX ORDER — CYCLOBENZAPRINE HCL 5 MG
5 TABLET ORAL 3 TIMES DAILY PRN
Start: 2024-11-11 | End: 2024-11-18

## 2024-11-11 RX ORDER — AMLODIPINE BESYLATE 10 MG/1
10 TABLET ORAL DAILY
Start: 2024-11-12 | End: 2025-01-11

## 2024-11-11 RX ORDER — ENOXAPARIN SODIUM 100 MG/ML
40 INJECTION SUBCUTANEOUS DAILY
Start: 2024-11-12 | End: 2024-12-08

## 2024-11-11 RX ORDER — OXYCODONE HYDROCHLORIDE 5 MG/1
5 TABLET ORAL EVERY 6 HOURS PRN
Qty: 28 TABLET | Refills: 0 | Status: SHIPPED | OUTPATIENT
Start: 2024-11-11 | End: 2024-11-18

## 2024-11-11 RX ORDER — DOCUSATE SODIUM 100 MG/1
100 CAPSULE, LIQUID FILLED ORAL 2 TIMES DAILY
Start: 2024-11-11 | End: 2024-11-21

## 2024-11-11 RX ORDER — ACETAMINOPHEN 325 MG/1
650 TABLET ORAL EVERY 6 HOURS
Start: 2024-11-11 | End: 2024-11-18

## 2024-11-11 RX ORDER — ONDANSETRON 4 MG/1
4 TABLET, FILM COATED ORAL EVERY 8 HOURS PRN
Start: 2024-11-11

## 2024-11-11 ASSESSMENT — COGNITIVE AND FUNCTIONAL STATUS - GENERAL
HELP NEEDED FOR BATHING: A LITTLE
MOVING FROM LYING ON BACK TO SITTING ON SIDE OF FLAT BED WITH BEDRAILS: A LITTLE
PERSONAL GROOMING: A LITTLE
TOILETING: A LITTLE
EATING MEALS: A LITTLE
MOVING FROM LYING ON BACK TO SITTING ON SIDE OF FLAT BED WITH BEDRAILS: A LITTLE
TURNING FROM BACK TO SIDE WHILE IN FLAT BAD: A LOT
DAILY ACTIVITIY SCORE: 15
DRESSING REGULAR UPPER BODY CLOTHING: A LITTLE
EATING MEALS: A LITTLE
EATING MEALS: A LITTLE
WALKING IN HOSPITAL ROOM: A LITTLE
MOVING TO AND FROM BED TO CHAIR: A LITTLE
DRESSING REGULAR UPPER BODY CLOTHING: A LITTLE
DRESSING REGULAR LOWER BODY CLOTHING: A LITTLE
MOVING FROM LYING ON BACK TO SITTING ON SIDE OF FLAT BED WITH BEDRAILS: A LITTLE
CLIMB 3 TO 5 STEPS WITH RAILING: A LITTLE
MOBILITY SCORE: 18
HELP NEEDED FOR BATHING: A LITTLE
MOBILITY SCORE: 15
TURNING FROM BACK TO SIDE WHILE IN FLAT BAD: A LITTLE
STANDING UP FROM CHAIR USING ARMS: A LITTLE
DRESSING REGULAR UPPER BODY CLOTHING: A LITTLE
TOILETING: A LOT
DRESSING REGULAR LOWER BODY CLOTHING: A LITTLE
MOVING TO AND FROM BED TO CHAIR: A LITTLE
WALKING IN HOSPITAL ROOM: A LITTLE
TURNING FROM BACK TO SIDE WHILE IN FLAT BAD: A LITTLE
HELP NEEDED FOR BATHING: A LOT
DAILY ACTIVITIY SCORE: 18
MOVING TO AND FROM BED TO CHAIR: A LITTLE
DRESSING REGULAR LOWER BODY CLOTHING: A LOT
DAILY ACTIVITIY SCORE: 18
PERSONAL GROOMING: A LITTLE
CLIMB 3 TO 5 STEPS WITH RAILING: A LITTLE
PERSONAL GROOMING: A LITTLE
WALKING IN HOSPITAL ROOM: A LITTLE
TOILETING: A LITTLE
STANDING UP FROM CHAIR USING ARMS: A LITTLE
STANDING UP FROM CHAIR USING ARMS: A LITTLE
MOBILITY SCORE: 18
CLIMB 3 TO 5 STEPS WITH RAILING: TOTAL

## 2024-11-11 ASSESSMENT — PAIN - FUNCTIONAL ASSESSMENT
PAIN_FUNCTIONAL_ASSESSMENT: 0-10

## 2024-11-11 ASSESSMENT — PAIN SCALES - GENERAL
PAINLEVEL_OUTOF10: 4
PAIN_LEVEL: 3
PAINLEVEL_OUTOF10: 4
PAINLEVEL_OUTOF10: 4

## 2024-11-11 ASSESSMENT — ACTIVITIES OF DAILY LIVING (ADL)
HOME_MANAGEMENT_TIME_ENTRY: 25
BATHING_WHERE_ASSESSED: EDGE OF BED
BATHING_LEVEL_OF_ASSISTANCE: MODERATE ASSISTANCE

## 2024-11-11 ASSESSMENT — PAIN DESCRIPTION - LOCATION: LOCATION: HIP

## 2024-11-11 ASSESSMENT — PAIN DESCRIPTION - ORIENTATION: ORIENTATION: LEFT

## 2024-11-11 NOTE — DISCHARGE SUMMARY
Discharge Diagnosis  Left hip fracture secondary to fall with questionable syncope, status post hip hemiarthroplasty  Syncope ruled out  LAVELL  History of compression fracture of T4    Issues Requiring Follow-Up  Outpatient follow-up with PCP and orthopedic surgery status post hip hemiarthroplasty    Discharge Meds     Medication List      START taking these medications     acetaminophen 325 mg tablet; Commonly known as: Tylenol; Take 2 tablets   (650 mg) by mouth every 6 hours for 7 days.   amLODIPine 10 mg tablet; Commonly known as: Norvasc; Take 1 tablet (10   mg) by mouth once daily.; Start taking on: November 12, 2024   cyclobenzaprine 5 mg tablet; Commonly known as: Flexeril; Take 1 tablet   (5 mg) by mouth 3 times a day as needed for muscle spasms for up to 7   days.   docusate sodium 100 mg capsule; Commonly known as: Colace; Take 1   capsule (100 mg) by mouth 2 times a day for 10 days.   enoxaparin 40 mg/0.4 mL syringe; Commonly known as: Lovenox; Inject 0.4   mL (40 mg) under the skin once daily for 26 doses.; Start taking on:   November 12, 2024   ondansetron 4 mg tablet; Commonly known as: Zofran; Take 1 tablet (4 mg)   by mouth every 8 hours if needed for nausea or vomiting.   oxyCODONE 5 mg immediate release tablet; Commonly known as: Roxicodone;   Take 1 tablet (5 mg) by mouth every 6 hours if needed for severe pain (7 -   10) for up to 7 days.     CONTINUE taking these medications     lisinopril 10 mg tablet       Test Results Pending At Discharge  Pending Labs       No current pending labs.            Hospital Course  Marita Aquino is a 79 y.o. female with past medical history of compression fracture T4 [2011], HTN, does not appear to be following with any PCP who presented s/p fall likely in the setting of syncope resulting in left hip fracture.      #Left hip fracture secondary to fall, status post hip hemiarthroplasty  #Questionable syncope associated with fall  #History of compression fracture  of T4  #LAVELL likely prerenal     Patient was placed on fall precaution, pain management, antiemetic as needed, telemetry monitor.  Cardiology consulted to rule out syncope and preop clearance, they adjusted her meds.  Ortho consulted, status post hip hemiarthroplasty.  Postprocedure was placed on Lovenox subcu for VTE prophylaxis postprocedure, Flexeril, pain management.  Echo done and reviewed: LVEF 60%, elevated pulmonary artery pressure  Orthostatic pressure was normal.   Bilateral carotid ultrasound showed mild intimal thickening bilaterally.   TSH normal  Leukocytosis likely reactive, no sign or symptoms of infection, patient was asymptomatic.  Patient had mild LAVELL, encouraged oral intake.   Monitored renal function, avoided nephrotoxic agent  VTE prophylaxis was with Lovenox subcu.  Continued with lisinopril and IV hydralazine for blood pressure control.  Patient will be discharged on lisinopril and amlodipine to SNF.  Disposition: PT/OT recommended high intensity level on discharge, plan for SNF placement.    Currently patient is hemodynamically stable and ready for discharge to SNF for better recovery.  She will be continued on DVT prophylaxis for 28 days, pain management, muscle relaxant, Colace, amlodipine, lisinopril.  She will follow as outpatient with orthopedic surgery and her primary care for further recommendation and medication adjustment.    Pertinent Physical Exam At Time of Discharge  Physical Exam  General: Well-developed female, in no acute distress  HEENT: AT, NC, no JVD, no lymphadenopathy, neck supple, hearing issues  Lungs: Clear, no wheezing, no crackles  Cardiac: Normal S1-S2, no murmur, no gallop  Abdomen: Soft, nontender, no distention, positive bowel sound  Extremities: no edema, pulses intact, ROM limited due to fracture of hip, LLE splinted  Neurological: Alert awake, sensation intact, oriented x3      Time spent 35 minutes  Ana Vasquez MD

## 2024-11-11 NOTE — CARE PLAN
The patient's goals for the shift include rest and comfort    Problem: Pain - Adult  Goal: Verbalizes/displays adequate comfort level or baseline comfort level  Outcome: Progressing     Problem: Safety - Adult  Goal: Free from fall injury  Outcome: Progressing     Problem: Chronic Conditions and Co-morbidities  Goal: Patient's chronic conditions and co-morbidity symptoms are monitored and maintained or improved  Outcome: Progressing     Problem: Skin  Goal: Prevent/manage excess moisture  Outcome: Progressing  Flowsheets (Taken 11/11/2024 0613)  Prevent/manage excess moisture: Moisturize dry skin     The clinical goals for the shift include Patient will reman free from falls and injury throughout shift    Over the shift, the patient remained free from falls and injury. Patient ambulated to restroom with assistance of staff and front wheeled walker. Patient complaints of pain managed with PRN medication as prescribed. Patient currently resting comfortably with stable vital signs.

## 2024-11-11 NOTE — PROGRESS NOTES
Social Work Note Per physician order, pt is to discharge to SNF today.  Transportation via ambulance per [facility], arranged for 12:30PM pickup.  Spoke with son, Andrea, and advised him of discharge time.  AVS and GF sent in Care Port.  RN provided with number for report and transport time.  Facility advised of transport time.  ILANA Morse

## 2024-11-11 NOTE — CARE PLAN
Problem: Pain - Adult  Goal: Verbalizes/displays adequate comfort level or baseline comfort level  Outcome: Progressing     Problem: Safety - Adult  Goal: Free from fall injury  Outcome: Progressing     Problem: Discharge Planning  Goal: Discharge to home or other facility with appropriate resources  Outcome: Progressing     Problem: Skin  Goal: Prevent/manage excess moisture  Outcome: Progressing  Goal: Prevent/minimize sheer/friction injuries  Outcome: Progressing  Goal: Promote skin healing  Outcome: Progressing   The patient's goals for the shift include rest and comfort    The clinical goals for the shift include Patient will reman free from falls and injury throughout shift    Over the shift, the patient did not make progress toward the following goals. Barriers to progression include acuteness of illness. Recommendations to address these barriers include continued education and reinforcement of information.

## 2024-11-11 NOTE — ANESTHESIA POSTPROCEDURE EVALUATION
Patient: Marita Aquino    Procedure Summary       Date: 11/09/24 Room / Location: [facility] / [facility]    Anesthesia Start: 0954 Anesthesia Stop: 1103    Procedure: Hip Hemiarthroplasty (Left: Hip) Diagnosis:       Closed fracture of left hip, initial encounter      (LEFT HIP FRACTURE)    Surgeons: Finesse Porter MD Responsible Provider: Celso Land MD    Anesthesia Type: general ASA Status: 3            Anesthesia Type: general    Vitals Value Taken Time   /36 11/09/24 1200   Temp 36.5 °C (97.7 °F) 11/09/24 1130   Pulse 68 11/09/24 1200   Resp 14 11/09/24 1200   SpO2 100 % 11/09/24 1200       Anesthesia Post Evaluation    Patient location during evaluation: PACU  Patient participation: complete - patient participated  Level of consciousness: awake  Pain score: 3  Pain management: adequate  Airway patency: patent  Cardiovascular status: acceptable and hemodynamically stable  Respiratory status: acceptable, nonlabored ventilation and room air  Hydration status: acceptable  Postoperative Nausea and Vomiting: none        No notable events documented.

## 2024-11-11 NOTE — PROGRESS NOTES
11/11/24 0858   Discharge Planning   Home or Post Acute Services Post acute facilities (Rehab/SNF/etc)   Type of Post Acute Facility Services Rehab;Skilled nursing   Expected Discharge Disposition SNF   Does the patient need discharge transport arranged? Yes   RoundTrip coordination needed? Yes   Has discharge transport been arranged? No     Pt is s/p POD #2 Left hip hemiarthroplasty for hip fracture. Pt is Weight bearing as tolerated with posterior hip precautions. Pt will DC on  Lovenox 40 mg subcutaneous daily x 28 days for DVT prophylaxis. [facility] scores are PT (15) OT (18) recommending continued therapy at High level/intensity. Revisited with pt regarding discharge planning, pt requests that I call her son Andrea. Called Andrea to discuss DC planning, Andrea requests SNF referral to [facility] because he knows someone who works there (Meagan Zurita). Referral built and sent to [facility], awaiting acceptance. JANAK Mahoney notified. CT team will monitor case for progression and DC planning.    Update: [facility] confirms able to accept pending auth, team notified to initiate SNF auth.

## 2024-11-11 NOTE — TELEPHONE ENCOUNTER
"Call from nurse.  Patient just arrived at [facility]  Chart briefly reviewed  Status post fall November 8, 2024 with resulting left displaced femoral neck fracture.  Underwent left hemiarthroplasty November 9, 2024    A few issues raised by nurse  Febrile  126/47, 103, 100.4, 18, 98% on room air  Repeat  122/38, 105, 101.2, 18, 98% on room air    Nurse reports patient \"wont talk\" noted significant hearing loss.  Hospital charts report \"poor historian\" and noted hearing loss.  Nursing supervisor reports she was able to get patient to talk \"hurry up\" when she attempted to take her photo.    Looking at operative note  100 cc estimated blood loss  No evidence of postoperative hematoma    At this time she is hemodynamically stable, will give Tylenol for fever, check UA, labs, chest x-ray.  Nursing reporting she is not thought to have had a bowel movement since before hospitalization, KUB, suppository to address this issue, MiraLAX going forward.    Monitor vitals and overall condition going forward.  "

## 2024-11-11 NOTE — PROGRESS NOTES
Physical Therapy    Physical Therapy Treatment    Patient Name: Marita Aquino  MRN: [id number]  Today's Date: 11/11/2024  Time Calculation  Start Time: 0944  Stop Time: 1012  Time Calculation (min): 28 min     604/604-A    Assessment/Plan   PT Assessment  PT Assessment Results: Decreased strength, Decreased range of motion, Decreased endurance, Impaired balance, Decreased mobility, Decreased safety awareness, Pain, Orthopedic restrictions  Rehab Prognosis: Good  End of Session Communication: Bedside nurse  Assessment Comment: apt is cooperative and agreeable to participate, would benefit from continued therapy to improve functional mobility , strength and safety  End of Session Patient Position: Up in chair, Alarm on     Treatment/Interventions: Bed mobility, Transfer training, Gait training, Stair training, Balance training, Neuromuscular re-education, Strengthening, Endurance training, Range of motion, Therapeutic exercise, Therapeutic activity, Home exercise program  PT Plan: Ongoing PT  PT Frequency: Daily  PT Discharge Recommendations: High intensity level of continued care    PT Recommended Transfer Status: Assist x1, Assistive device    General Visit Information:   PT  Visit  PT Received On: 11/11/24  General  Reason for Referral: recent surgery, 11/8 s/p fall to L hip. Pt is s/p L hip nilda ([place]) on 11/9  Family/Caregiver Present: No  Prior to Session Communication: Bedside nurse (cleared by  ursing to participate)  Patient Position Received: Up in chair, Alarm on  General Comment: pt is agreeable to parrticipate    General Observations:        Subjective     Precautions:  Precautions  Hearing/Visual Limitations: very [place]  LE Weight Bearing Status: Weight Bearing as Tolerated  Medical Precautions: Fall precautions  Post-Surgical Precautions: Left hip precautions (posterior hip precuations)  Precautions Comment: reviewed hip precuation verbally and using written instructions, pt able to recall one of  "three precautions    Vital Signs:     Objective     Pain:  Pain Assessment  Pain Assessment: 0-10  0-10 (Numeric) Pain Score:  (states \"its not  bad \"  no numerical value given)  Pain Location: Hip  Pain Orientation: Left  Pain Interventions: Cold pack    Cognition:  Cognition  Overall Cognitive Status: Within Functional Limits  Orientation Level:  (increased time for processing, may be due to pt being very hard of hearing)        Activity Tolerance:  Activity Tolerance  Endurance: Endurance does not limit participation in activity    Treatments:  Therapeutic Exercise  Therapeutic Exercise Performed:  (hip precautions maintained while pt performed ther ex incuding Ap, QS, LAQ , abd/add pillow squeeze  and heel slides in small ROM , with no c/o increased pain)     Bed Mobility  Bed Mobility: No (seated upon arrival)  Ambulation/Gait Training  Ambulation/Gait Training Performed: Yes  Ambulation/Gait Training 1  Surface 1: Level tile  Device 1: Rolling walker  Assistance 1: Minimum assistance  Comments/Distance (ft) 1: pt ambulating 10 ft with WW and Min A and chair follow , verbal and tactile cues required for sequencing and safety  Transfers  Transfer: Yes  Transfer 1  Technique 1: Sit to stand, Stand to sit  Trials/Comments 1: transfers  sit to stand with WW and Min A , stand to sit with moderate verbal and tactile cues to maintain hip precautions  Stairs  Stairs: No          Outcome Measures:     [facility] Basic Mobility  Turning from your back to your side while in a flat bed without using bedrails: A little  Moving from lying on your back to sitting on the side of a flat bed without using bedrails: A lot  Moving to and from bed to chair (including a wheelchair): A little  Standing up from a chair using your arms (e.g. wheelchair or bedside chair): A little  To walk in hospital room: A little  Climbing 3-5 steps with railing: Total  Basic Mobility - Total Score: 15            EDUCATION:  Outpatient " Education  Individual(s) Educated: Patient  Education Provided: Body Mechanics, Fall Risk, Home Exercise Program, Post-Op Precautions  Patient Response to Education: Patient/Caregiver Verbalized Understanding of Information    Encounter Problems       Encounter Problems (Active)       PT Problem       Pt will demonstrate sup > sit and sit > sup bed mobility with CGA (Progressing)       Start:  11/10/24    Expected End:  11/24/24            Pt will demo sit > stand and stand > sit transfer with FWW and CGA  (Progressing)       Start:  11/10/24    Expected End:  11/24/24            Pt will ambulate 50' with FWW and CGA, without LOB  (Progressing)       Start:  11/10/24    Expected End:  11/24/24            Pt will demo up/down 2 steps with handrail and CGA (Not Progressing)       Start:  11/10/24    Expected End:  11/24/24            Pt will verbalize and demonstrate understanding of posterior hip precautions throughout PT treatment (Progressing)       Start:  11/10/24    Expected End:  11/24/24                   Pain - Adult

## 2024-11-11 NOTE — PROGRESS NOTES
Hip surgery    Progress Note    Subjective:     Post-Operative Day # 2   Status Post left Hip Tucker Arthroplasty    Systemic or Specific Complaints: No Complaints    Objective:     Visit Vitals  /61 (BP Location: Right arm, Patient Position: Lying)   Pulse 92   Temp 36 °C (96.8 °F) (Temporal)   Resp 16       General: alert and oriented, in no acute distress, appears stated age, and cooperative   Wound: no erythema, no edema, no drainage, and dressing remains clean, dry, and intact   Motion: Painful range of Motion   DVT Exam: No evidence of DVT seen on physical exam.  Negative Loren's sign.  No significant calf/ankle edema.       NVI in lower extremity. left thigh soft to palpation. Strong equal dorsi/plantar flexion bilaterally. Good distal pulses bilaterally.      Data Review  Recent Results (from the past 24 hours)   Magnesium    Collection Time: 11/11/24  7:11 AM   Result Value Ref Range    Magnesium 1.94 1.60 - 2.40 mg/dL     Carotid duplex bilateral    Result Date: 11/10/2024  Interpreted By:  Bisi Stein, STUDY: [id number]; 11/9/2024 2:39 pm   INDICATION: Signs/Symptoms:syncope :   COMPARISON: None.   ACCESSION NUMBER(S): [id number]   ORDERING CLINICIAN: ABE HORN   TECHNIQUE: Carotid Examination using B-mode, color flow and doppler spectral analysis.   FINDINGS: RIGHT CAROTID SYSTEM DCCA PSV/EDV: 85.6 cm/s / 18.1 cm/s ECA PSV: 119.9 cm/s PICA PSV/EDV: 75.6 / 21.5 cm/sec JENNIFFER PSV/EDV: 90.8 cm/s / 27.1 cm/s DICA PSV/EDV: 145.7 cm/s / 34.6 cm/s ZAID/VCC Ratio: 1.7 VERT : Antegrade   LEFT CAROTID SYSTEM DCCA PSV/EDV: 91.3 cm/s / 17.2 cm/s ECA PSV: 145.3 cm/s PICA PSV/EDV: 71.3 cm/s / 8.5 cm/s JENNIFFER PSV/EDV: 77.8cm/s/12.8 cm/sec DICA PSV/EDV: 114.7 cm/s/21.5 cm/s ZAID/VCC Ratio: 1.3 VERT : Antegrade   DUPLEX IMAGES: Right Carotid System: Mild intimal thickening is noted.   Left Carotid System: Mild intimal thickening is present.   Interpretation is based upon the IAC recommendations which are the 2021  Bed: 038A  Expected date:   Expected time:   Means of arrival:   Comments:  IP hold     Dru Merlos RN  03/02/20 3787 modified diagnostic criteria adopted from the 2003 Society of Radiologist and Ultrasound Consensus Conference.       Mild intimal thickening noted bilaterally without plaque formation or stenosis affecting either internal carotid artery   Antegrade flow in both vertebral arteries.   MACRO: None.   Signed by: Bisi Stein 11/10/2024 9:26 AM Dictation workstation:   [id number]    XR hip left with pelvis when performed 1 view    Result Date: 11/10/2024  Interpreted By:  Bisi Stein, STUDY: XR HIP LEFT WITH PELVIS WHEN PERFORMED 1 VIEW 11/9/2024 1:52 pm   INDICATION: Signs/Symptoms:post op, asymptomatic   COMPARISON: 11/08/2024   ACCESSION NUMBER(S): [id number]   ORDERING CLINICIAN: LUCY HULL   TECHNIQUE: A single portable view of the left hip is completed.   FINDINGS: There is postoperative change from left hip arthroplasty with no acute bony abnormality identified. Prosthesis is well seated within the native acetabulum.       Satisfactory postoperative appearance left hip arthroplasty.   Signed by: Bisi Stein 11/10/2024 6:32 AM Dictation workstation:   [id number]    Operative Images    Result Date: 11/9/2024  This study is a surgery completed in an invasive cardiovascular space. Please see OpNote on Notes tab for findings.    ECG 12 lead    Result Date: 11/9/2024  Normal sinus rhythm Normal ECG When compared with ECG of 25-MAY-2017 09:37, No significant change was found Confirmed by Bienvenido Elizabeth ([id number]) on 11/9/2024 10:11:21 AM    Transthoracic Echo (TTE) Complete    Result Date: 11/9/2024          [address] [address]  Tel [phone] Fax [phone] TRANSTHORACIC ECHOCARDIOGRAM REPORT Patient Name:       HALEIGHBRANDIE DURAND    Reading Physician:    [id number] Bienvenido Elizabeth DO Study Date:         11/9/2024            Ordering Provider:    [id number] LUWDIN                                                                 RONI REICH MRN/PID:            [id number]             Fellow: Accession#:         [id number]         Nurse: Date of Birth/Age:  1945 / 79 years Sonographer:          Daylin STALLWORTH Gender Assigned at  F                    Additional Staff: Birth: Height:             157.48 cm            Admit Date:           11/8/2024 Weight:             47.63 kg             Admission Status:     Inpatient - STAT BSA / BMI:          1.45 m2 / 19.20      Department Location:  [address] [place] [address]                     kg/m2 Blood Pressure: 182 /78 mmHg Study Type:    TRANSTHORACIC ECHO (TTE) COMPLETE Diagnosis/ICD: Encounter for other preprocedural examination-Z01.818 Indication:    Pre-Op Evaluation CPT Codes:     Echo Complete w Full Doppler-[id number]  Study Detail: The following Echo studies were performed: 2D, M-Mode, Doppler and               color flow. Technically challenging study due to patient lying in               supine position. The patient was awake.  PHYSICIAN INTERPRETATION: Left Ventricle: Left ventricular ejection fraction is normal, by visual estimate at 60%. There are no regional wall motion abnormalities. The left ventricular cavity size is normal. There is normal septal and normal posterior left ventricular wall thickness. There is left ventricular concentric remodeling. Spectral Doppler shows a Grade I (impaired relaxation pattern) of left ventricular diastolic filling with normal left atrial filling pressure. LV Wall Scoring: All segments are normal. Left Atrium: The left atrium is normal in size. Right Ventricle: The right ventricle is normal in size. There is normal right ventricular global systolic function. Right Atrium: The right atrium is normal in size. Aortic Valve: The aortic valve appears structurally normal. There is no evidence of aortic valve stenosis. The aortic valve dimensionless index is 0.85. There is no evidence of aortic valve  regurgitation. The peak instantaneous gradient of the aortic valve is 7 mmHg. The mean gradient of the aortic valve is 4 mmHg. Mitral Valve: The mitral valve is normal in structure. There is mild thickening and calcification of the anterior and posterior mitral valve leaflets. There is no evidence of mitral valve stenosis. There is normal mitral valve leaflet mobility. There is no evidence of mitral valve regurgitation. Tricuspid Valve: The tricuspid valve is structurally normal. There is normal tricuspid valve leaflet mobility. There is mild tricuspid regurgitation. Pulmonic Valve: The pulmonic valve is structurally normal. There is trace pulmonic valve regurgitation. Pericardium: No pericardial effusion noted. Aorta: The aortic root is normal. Pulmonary Artery: The main pulmonary artery is normal in size, and position, with normal bifurcation into the left and right pulmonary arteries. The tricuspid regurgitant velocity is 2.99 m/s, and with an estimated right atrial pressure of 3 mmHg, the estimated pulmonary artery pressure is mild to moderately elevated with the RVSP at 38.8 mmHg. Systemic Veins: The inferior vena cava appears normal in size. In comparison to the previous echocardiogram(s): There are no prior studies on this patient for comparison purposes.  CONCLUSIONS:  1. Left ventricular ejection fraction is normal, by visual estimate at 60%.  2. There is normal right ventricular global systolic function.  3. There is no evidence of mitral valve stenosis.  4. No evidence of mitral valve regurgitation.  5. Aortic valve stenosis is not present.  6. Mild to moderately elevated pulmonary artery pressure.  7. The main pulmonary artery is normal in size, and position, with normal bifurcation into the left and right pulmonary arteries. RECOMMENDATIONS: Technically suboptimal and limited study, therefore accuracy of above interpretation could be substantially diminished. Clinical correlation is advised. Consider  additional imaging modalities if clinically indicated.  QUANTITATIVE DATA SUMMARY:  2D MEASUREMENTS:           Normal Ranges: Ao Root d:       2.50 cm   (2.0-3.7cm) LAs:             2.50 cm   (2.7-4.0cm) IVSd:            0.70 cm   (0.6-1.1cm) LVPWd:           0.70 cm   (0.6-1.1cm) LVIDd:           3.20 cm   (3.9-5.9cm) LVIDs:           2.30 cm LV Mass Index:   37.4 g/m2 LV % FS          28.1 %  LA VOLUME:                    Normal Ranges: LA Vol A4C:        20.8 ml    (22+/-6mL/m2) LA Vol A2C:        19.8 ml LA Vol BP:         20.6 ml LA Vol Index A4C:  14.3ml/m2 LA Vol Index A2C:  13.6 ml/m2 LA Vol Index BP:   14.1 ml/m2 LA Area A4C:       10.4 cm2 LA Area A2C:       10.3 cm2 LA Major Axis A4C: 4.4 cm LA Major Axis A2C: 4.6 cm LA Volume Index:   13.3 ml/m2  RA VOLUME BY A/L METHOD:            Normal Ranges: RA Vol A4C:              22.6 ml    (8.3-19.5ml) RA Vol Index A4C:        15.5 ml/m2 RA Area A4C:             10.5 cm2 RA Major Axis A4C:       4.2 cm  AORTA MEASUREMENTS:         Normal Ranges: Asc Ao, d:          2.30 cm (2.1-3.4cm)  LV SYSTOLIC FUNCTION BY 2D PLANIMETRY (MOD):                      Normal Ranges: EF-A4C View:    61 % (>=55%) EF-A2C View:    58 % EF-Biplane:     58 % EF-Visual:      60 % LV EF Reported: 60 %  LV DIASTOLIC FUNCTION:             Normal Ranges: MV Peak E:             0.74 m/s    (0.7-1.2 m/s) MV Peak A:             0.94 m/s    (0.42-0.7 m/s) E/A Ratio:             0.79        (1.0-2.2) MV e'                  0.097 m/s   (>8.0) MV lateral e'          0.10 m/s MV medial e'           0.09 m/s E/e' Ratio:            7.57        (<8.0) PulmV Sys Shar:         48.40 cm/s PulmV Tucker Shar:        40.30 cm/s PulmV S/D Shar:         1.20 PulmV A Revs Shar:      38.60 cm/s PulmV A Revs Dur:      137.00 msec  MITRAL VALVE:          Normal Ranges: MV DT:        262 msec (150-240msec)  AORTIC VALVE:                     Normal Ranges: AoV Vmax:                1.34 m/s (<=1.7m/s) AoV Peak PG:              7.2 mmHg (<20mmHg) AoV Mean P.0 mmHg (1.7-11.5mmHg) LVOT Max Shar:            1.02 m/s (<=1.1m/s) AoV VTI:                 27.40 cm (18-25cm) LVOT VTI:                23.30 cm LVOT Diameter:           1.90 cm  (1.8-2.4cm) AoV Area, VTI:           2.41 cm2 (2.5-5.5cm2) AoV Area,Vmax:           2.16 cm2 (2.5-4.5cm2) AoV Dimensionless Index: 0.85  RIGHT VENTRICLE: RV Basal 3.00 cm RV Mid   2.60 cm RV Major 6.2 cm TAPSE:   16.1 mm RV s'    0.13 m/s  TRICUSPID VALVE/RVSP:          Normal Ranges: Peak TR Velocity:     2.99 m/s RV Syst Pressure:     39 mmHg  (< 30mmHg) IVC Diam:             0.70 cm  PULMONIC VALVE:          Normal Ranges: PV Accel Time:  50 msec  (>120ms) PV Max Shar:     1.0 m/s  (0.6-0.9m/s) PV Max PG:      3.8 mmHg  Pulmonary Veins: PulmV A Revs Dur: 137.00 msec PulmV A Revs Shar: 38.60 cm/s PulmV Tucker Shar:   40.30 cm/s PulmV S/D Shar:    1.20 PulmV Sys Shar:    48.40 cm/s  [id number] Bienvenido Elizabeth DO Electronically signed on 2024 at 9:36:37 AM  Wall Scoring  ** Final **     XR hip left with pelvis when performed 2 or 3 views    Addendum Date: 2024    Interpreted By:  Lucy Wilburn, ADDENDUM: Study: Pelvis one view Left hip, two views.   Signed by: Lucy Wilburn 2024 7:38 PM   -------- ORIGINAL REPORT -------- Dictation workstation:   [id number]    Result Date: 2024  Interpreted By:  Lucy Wilburn, STUDY: Left hip, two views   INDICATION: Signs/Symptoms:fall, left hip pain.   COMPARISON: None.   ACCESSION NUMBER(S): [id number]   ORDERING CLINICIAN: BIENVENIDO NARAYANAN   FINDINGS: There is a nondisplaced mildly impacted subcapital left femoral neck fracture with varus angulation of the femoral shaft. Soft tissues are within normal limits. Left hip joint space is maintained.       1. Subcapital left femoral neck fracture.   MACRO: None.   Signed by: Lucy Wilburn 2024 6:44 PM Dictation workstation:   [id number]    CT hip left wo IV contrast    Result Date:  11/8/2024  Interpreted By:  Modesto Mak, STUDY: CT HIP LEFT WO IV CONTRAST; CT 3D RECONSTRUCTION;  11/8/2024 6:54 pm   INDICATION: Signs/Symptoms:hip fx; Signs/Symptoms:trauma.     COMPARISON: Same day radiographs   ACCESSION NUMBER(S): [id number]; [id number]   ORDERING CLINICIAN: HUEY NARAYANAN   TECHNIQUE: CT imaging of the left hip was obtained without contrast. Coronal and sagittal reformatted images were performed. 3D reconstructed images were created on an independent workstation and reviewed.   FINDINGS: OSSEOUS STRUCTURES:   There is redemonstration of the displaced, comminuted and slightly impacted left subcapital femoral fracture, with anterior angulation of the fracture fragments. There is a small associated joint effusion with mild edema present in the adjacent musculature.   No other acute bony abnormalities are present in the partially visualized pelvis or sacrum.   SOFT TISSUES:   Mild fat stranding is present in the cutaneous tissues of the posterior left hip without evidence of fluid collection.   Numerous diverticula are partially visualized in the sigmoid colon without evidence of acute abnormality within the partially visualized abdomen and pelvis.       Displaced, comminuted and slightly impacted left subcapital femoral fracture with anterior angulation of the fracture fragments.   MACRO: None.   Signed by: Modesto Mak 11/8/2024 7:16 PM Dictation workstation:   [id number]    CT 3D reconstruction    Result Date: 11/8/2024  Interpreted By:  Modesto Mak, STUDY: CT HIP LEFT WO IV CONTRAST; CT 3D RECONSTRUCTION;  11/8/2024 6:54 pm   INDICATION: Signs/Symptoms:hip fx; Signs/Symptoms:trauma.     COMPARISON: Same day radiographs   ACCESSION NUMBER(S): [id number]; [id number]   ORDERING CLINICIAN: HUEY NARAYANAN   TECHNIQUE: CT imaging of the left hip was obtained without contrast. Coronal and sagittal reformatted images were performed. 3D reconstructed images  were created on an independent workstation and reviewed.   FINDINGS: OSSEOUS STRUCTURES:   There is redemonstration of the displaced, comminuted and slightly impacted left subcapital femoral fracture, with anterior angulation of the fracture fragments. There is a small associated joint effusion with mild edema present in the adjacent musculature.   No other acute bony abnormalities are present in the partially visualized pelvis or sacrum.   SOFT TISSUES:   Mild fat stranding is present in the cutaneous tissues of the posterior left hip without evidence of fluid collection.   Numerous diverticula are partially visualized in the sigmoid colon without evidence of acute abnormality within the partially visualized abdomen and pelvis.       Displaced, comminuted and slightly impacted left subcapital femoral fracture with anterior angulation of the fracture fragments.   MACRO: None.   Signed by: Modesto Mak 11/8/2024 7:16 PM Dictation workstation:   [id number]    CT head wo IV contrast    Result Date: 11/8/2024  Interpreted By:  Modesto Mak, STUDY: CT HEAD WO IV CONTRAST; CT CERVICAL SPINE WO IV CONTRAST;  11/8/2024 6:53 pm   INDICATION: Signs/Symptoms:syncope, fall; Signs/Symptoms:fall.     COMPARISON: CT head dated 02/03/2012.   ACCESSION NUMBER(S): [id number]; [id number]   ORDERING CLINICIAN: HUEY NARAYANAN   TECHNIQUE: Noncontrast axial CT scan of head was performed, with coronal and sagittal reformats provided. The images were reviewed in bone, brain, blood and soft tissue windows.   Axial CT images of the cervical spine are obtained. Axial, coronal and sagittal reconstructions are provided for review.   FINDINGS: CT HEAD:   No hyperdense intracranial hemorrhage is present. There is no mass effect or midline shift.   Gray-white differentiation is intact, without evidence of CT apparent transcortical infarct. Subtle attenuation changes present in the periventricular and subcortical white  matter of bilateral cerebral hemispheres are nonspecific, but favored to represent sequela of microvascular disease.   No abnormal ventricular dilatation is present. Basal cisterns are patent. No extra-axial fluid collection is identified.   Scalp soft tissues do not demonstrate any acute abnormality. No skull fracture or other acute calvarial abnormality is present.   Mastoid air cells and middle ear cavities are clear without evidence of acute abnormality. Visualized paranasal sinuses are unremarkable in appearance.   CT C-SPINE:   Cervical vertebral alignment is maintained, without significant spondylolisthesis.   Cervical vertebral body heights are preserved, without evidence of compression fractures. Posterior elements of the cervical spine do not demonstrate any evidence of acute trauma.   Craniocervical junction is intact. Facet joints are preserved without evidence of subluxation or perching.   No significant intervertebral disc height loss is present. Several large anterior disc osteophytes are present at the level of C3-C4, C4-C5, C5-C6 and to lesser extent C6-C7.   No high-grade spinal canal stenosis is present. Small disc bulge is evident at the level of C5-C6, with some effacement of the anterior subarachnoid space and mild associated spinal canal narrowing in conjunction with ligamentum flavum thickening.   No significant bony neural foraminal stenosis is identified.   Prevertebral and paraspinal soft tissues are unremarkable in appearance.       CT HEAD:   No evidence of hemorrhage, skull fracture, or other acute intracranial trauma/abnormality.   CT C-SPINE:   No evidence of acute trauma to the cervical spine.   MACRO: None   Signed by: Modesto Mak 11/8/2024 7:12 PM Dictation workstation:   [id number]    CT cervical spine wo IV contrast    Result Date: 11/8/2024  Interpreted By:  Modesto Mak, STUDY: CT HEAD WO IV CONTRAST; CT CERVICAL SPINE WO IV CONTRAST;  11/8/2024 6:53 pm    INDICATION: Signs/Symptoms:syncope, fall; Signs/Symptoms:fall.     COMPARISON: CT head dated 02/03/2012.   ACCESSION NUMBER(S): [id number]; [id number]   ORDERING CLINICIAN: HUEY NARAYANAN   TECHNIQUE: Noncontrast axial CT scan of head was performed, with coronal and sagittal reformats provided. The images were reviewed in bone, brain, blood and soft tissue windows.   Axial CT images of the cervical spine are obtained. Axial, coronal and sagittal reconstructions are provided for review.   FINDINGS: CT HEAD:   No hyperdense intracranial hemorrhage is present. There is no mass effect or midline shift.   Gray-white differentiation is intact, without evidence of CT apparent transcortical infarct. Subtle attenuation changes present in the periventricular and subcortical white matter of bilateral cerebral hemispheres are nonspecific, but favored to represent sequela of microvascular disease.   No abnormal ventricular dilatation is present. Basal cisterns are patent. No extra-axial fluid collection is identified.   Scalp soft tissues do not demonstrate any acute abnormality. No skull fracture or other acute calvarial abnormality is present.   Mastoid air cells and middle ear cavities are clear without evidence of acute abnormality. Visualized paranasal sinuses are unremarkable in appearance.   CT C-SPINE:   Cervical vertebral alignment is maintained, without significant spondylolisthesis.   Cervical vertebral body heights are preserved, without evidence of compression fractures. Posterior elements of the cervical spine do not demonstrate any evidence of acute trauma.   Craniocervical junction is intact. Facet joints are preserved without evidence of subluxation or perching.   No significant intervertebral disc height loss is present. Several large anterior disc osteophytes are present at the level of C3-C4, C4-C5, C5-C6 and to lesser extent C6-C7.   No high-grade spinal canal stenosis is present. Small disc bulge is  evident at the level of C5-C6, with some effacement of the anterior subarachnoid space and mild associated spinal canal narrowing in conjunction with ligamentum flavum thickening.   No significant bony neural foraminal stenosis is identified.   Prevertebral and paraspinal soft tissues are unremarkable in appearance.       CT HEAD:   No evidence of hemorrhage, skull fracture, or other acute intracranial trauma/abnormality.   CT C-SPINE:   No evidence of acute trauma to the cervical spine.   MACRO: None   Signed by: Modesto Mak 11/8/2024 7:12 PM Dictation workstation:   [id number]    XR chest 1 view    Result Date: 11/8/2024  Interpreted By:  Lucy Wilburn, STUDY: Chest, single AP view.   INDICATION: Signs/Symptoms:fall.   COMPARISON: 06/15/2015   ACCESSION NUMBER(S): [id number]   ORDERING CLINICIAN: HUEY NARAYANAN   FINDINGS: The cardiac silhouette size is within normal limits. There is no focal consolidation, edema or pneumothorax. No sizeable pleural effusion. No acute osseous abnormality.       1. No acute cardiopulmonary process.   MACRO: None.   Signed by: Lucy Wilburn 11/8/2024 6:45 PM Dictation workstation:   [id number]      Assessment:     Status Post left Hip Tucker Arthroplasty. Doing well postoperatively. No acute events overnight.     Acute postoperative pain: Reports mild to moderate pain to operative extremity. Exacerbated by movement, relieved by rest ice and pain medication. Continue current pain management.     Plan:      1.  Continues current post-op course   2.  Continue Deep venous thrombosis prophylaxis: Lovenox 40 mg subcutaneous daily for 28 days  3.  Continue physical therapy: Weight bearing as tolerated with posterior hip precautions  4.  Continue Pain Control: script in chart  5.  Discharge planning: patient plans to go to SNF vs Acute Rehab upon discharge. SW and TCC following for discharge planning.       KEYON Lawler-CNP   11/11/2024 8:47 AM

## 2024-11-25 ENCOUNTER — PATIENT OUTREACH (OUTPATIENT)
Dept: CARE COORDINATION | Facility: CLINIC | Age: 79
End: 2024-11-25
Payer: MEDICARE

## 2024-11-25 NOTE — PROGRESS NOTES
[facility]  Admitted 11/8/2024  Discharged 11/11/2024  Dx: Fall with Left Hip Fracture  11/9/2024 s/p: left hip hemiarthroplasty     Lovenox (12/8/2024)    Discharged to SNF: [facility]  Admitted 11/11/2024  Discharged 11/23/2024  Dx: muscle weakness, after care following a surgery  Discharged with [facility] HHC: Nursing/PT/OT/HHA    Ortho follow up 12/3/2024    Outreach call to patient to support a smooth transition of care from recent admission.  Patient did not answer phone and the voicemail box has not been set-up. Will continue to monitor through transition period.    Ruthie Mariscal RN/CM   [facility]  [phone]

## 2024-12-03 ENCOUNTER — HOSPITAL ENCOUNTER (OUTPATIENT)
Dept: RADIOLOGY | Facility: CLINIC | Age: 79
Discharge: HOME | End: 2024-12-03
Payer: MEDICARE

## 2024-12-03 ENCOUNTER — OFFICE VISIT (OUTPATIENT)
Dept: ORTHOPEDIC SURGERY | Facility: CLINIC | Age: 79
End: 2024-12-03
Payer: MEDICARE

## 2024-12-03 DIAGNOSIS — Z47.1 AFTERCARE FOLLOWING RIGHT HIP JOINT REPLACEMENT SURGERY: Primary | ICD-10-CM

## 2024-12-03 DIAGNOSIS — S72.002A CLOSED FRACTURE OF LEFT HIP, INITIAL ENCOUNTER: ICD-10-CM

## 2024-12-03 DIAGNOSIS — Z96.641 AFTERCARE FOLLOWING RIGHT HIP JOINT REPLACEMENT SURGERY: Primary | ICD-10-CM

## 2024-12-03 PROCEDURE — 99024 POSTOP FOLLOW-UP VISIT: CPT | Performed by: ORTHOPAEDIC SURGERY

## 2024-12-03 PROCEDURE — 99211 OFF/OP EST MAY X REQ PHY/QHP: CPT | Performed by: ORTHOPAEDIC SURGERY

## 2024-12-03 PROCEDURE — 1111F DSCHRG MED/CURRENT MED MERGE: CPT | Performed by: ORTHOPAEDIC SURGERY

## 2024-12-03 PROCEDURE — 73502 X-RAY EXAM HIP UNI 2-3 VIEWS: CPT | Mod: LT

## 2024-12-03 PROCEDURE — 73502 X-RAY EXAM HIP UNI 2-3 VIEWS: CPT | Mod: LEFT SIDE | Performed by: ORTHOPAEDIC SURGERY

## 2024-12-04 ENCOUNTER — PATIENT OUTREACH (OUTPATIENT)
Dept: CARE COORDINATION | Facility: CLINIC | Age: 79
End: 2024-12-04
Payer: MEDICARE

## 2024-12-04 NOTE — PROGRESS NOTES
Outreach call to patient following up on appointment with Orthopedic provider. Patient did not answer the phone and voicemail has not been set up.  Will continue to follow.      Ruthie Mariscal RN/CM  [facility]  [phone]

## 2024-12-04 NOTE — PROGRESS NOTES
History of present illness    79-year-old female first postop visit left hemiarthroplasty surgery November 9 states doing well is having minimal pain she is using a walker for assistive ice she is home she is been discharged from the rehab facility no fevers or chills no chest pain or shortness of breath no redness or drainage      Past medical , Surgical, Family and social history reviewed.      Physical exam  General: No acute distress and breathing comfortably.  Patient is pleasant and cooperative with the examination.    Extremity  Incisions well-approximated no pain with internal ex rotation neurologically intact distally brisk cap refill compartments soft calf is nontender Homans' sign negative    Diagnostics      XR hip left with pelvis when performed 2 or 3 views    Result Date: 12/3/2024  Interpreted By:  Finesse Porter, STUDY: XR HIP LEFT WITH PELVIS WHEN PERFORMED 2 OR 3 VIEWS; 12/3/2024 9:21 am   INDICATION: Signs/Symptoms:pain.   ACCESSION NUMBER(S): [id number]   ORDERING CLINICIAN: FINESSE PORTER   FINDINGS: Left hip AP and lateral views. Status post cemented hemiarthroplasty components in good position no signs of fracture dislocation or other bony abnormality     Signed by: Finesse Porter 12/3/2024 11:05 AM Dictation workstation:   [id number]    Carotid duplex bilateral    Result Date: 11/10/2024  Interpreted By:  Bisi Stein, STUDY: [id number]; 11/9/2024 2:39 pm   INDICATION: Signs/Symptoms:syncope :   COMPARISON: None.   ACCESSION NUMBER(S): [id number]   ORDERING CLINICIAN: FINESSE PORTER   TECHNIQUE: Carotid Examination using B-mode, color flow and doppler spectral analysis.   FINDINGS: RIGHT CAROTID SYSTEM DCCA PSV/EDV: 85.6 cm/s / 18.1 cm/s ECA PSV: 119.9 cm/s PICA PSV/EDV: 75.6 / 21.5 cm/sec JENNIFFER PSV/EDV: 90.8 cm/s / 27.1 cm/s DICA PSV/EDV: 145.7 cm/s / 34.6 cm/s ZAID/VCC Ratio: 1.7 VERT : Antegrade   LEFT CAROTID SYSTEM DCCA PSV/EDV: 91.3 cm/s / 17.2 cm/s ECA  PSV: 145.3 cm/s PICA PSV/EDV: 71.3 cm/s / 8.5 cm/s JENNIFFER PSV/EDV: 77.8cm/s/12.8 cm/sec DICA PSV/EDV: 114.7 cm/s/21.5 cm/s ZAID/VCC Ratio: 1.3 VERT : Antegrade   DUPLEX IMAGES: Right Carotid System: Mild intimal thickening is noted.   Left Carotid System: Mild intimal thickening is present.   Interpretation is based upon the IAC recommendations which are the 2021 modified diagnostic criteria adopted from the 2003 Society of Radiologist and Ultrasound Consensus Conference.       Mild intimal thickening noted bilaterally without plaque formation or stenosis affecting either internal carotid artery   Antegrade flow in both vertebral arteries.   MACRO: None.   Signed by: Bisi Stein 11/10/2024 9:26 AM Dictation workstation:   [id number]    XR hip left with pelvis when performed 1 view    Result Date: 11/10/2024  Interpreted By:  Bisi Stein, STUDY: XR HIP LEFT WITH PELVIS WHEN PERFORMED 1 VIEW 11/9/2024 1:52 pm   INDICATION: Signs/Symptoms:post op, asymptomatic   COMPARISON: 11/08/2024   ACCESSION NUMBER(S): [id number]   ORDERING CLINICIAN: LUCY HULL   TECHNIQUE: A single portable view of the left hip is completed.   FINDINGS: There is postoperative change from left hip arthroplasty with no acute bony abnormality identified. Prosthesis is well seated within the native acetabulum.       Satisfactory postoperative appearance left hip arthroplasty.   Signed by: Bisi Stein 11/10/2024 6:32 AM Dictation workstation:   [id number]    Operative Images    Result Date: 11/9/2024  This study is a surgery completed in an invasive cardiovascular space. Please see OpNote on Notes tab for findings.    ECG 12 lead    Result Date: 11/9/2024  Normal sinus rhythm Normal ECG When compared with ECG of 25-MAY-2017 09:37, No significant change was found Confirmed by Bienvenido Elizabeth ([id number]) on 11/9/2024 10:11:21 AM    Transthoracic Echo (TTE) Complete    Result Date: 11/9/2024          [address]  [id number]  Tel [phone] Fax [phone] TRANSTHORACIC ECHOCARDIOGRAM REPORT Patient Name:       HALEIGH DURAND    Reading Physician:    [id number] Bienvenido Elizabeth  Study Date:         11/9/2024            Ordering Provider:    [id number] MARYANADONALD GONZALESJAVIER REICH MRN/PID:            [id number]             Fellow: Accession#:         [id number]         Nurse: Date of Birth/Age:  1945 / 79 years Sonographer:          Daylin STALLWORTH Gender Assigned at  F                    Additional Staff: Birth: Height:             157.48 cm            Admit Date:           11/8/2024 Weight:             47.63 kg             Admission Status:     Inpatient - STAT BSA / BMI:          1.45 m2 / 19.20      Department Location:  [address] [place] [address]                     kg/m2 Blood Pressure: 182 /78 mmHg Study Type:    TRANSTHORACIC ECHO (TTE) COMPLETE Diagnosis/ICD: Encounter for other preprocedural examination-Z01.818 Indication:    Pre-Op Evaluation CPT Codes:     Echo Complete w Full Doppler-[id number]  Study Detail: The following Echo studies were performed: 2D, M-Mode, Doppler and               color flow. Technically challenging study due to patient lying in               supine position. The patient was awake.  PHYSICIAN INTERPRETATION: Left Ventricle: Left ventricular ejection fraction is normal, by visual estimate at 60%. There are no regional wall motion abnormalities. The left ventricular cavity size is normal. There is normal septal and normal posterior left ventricular wall thickness. There is left ventricular concentric remodeling. Spectral Doppler shows a Grade I (impaired relaxation pattern) of left ventricular diastolic filling with normal left atrial filling pressure. LV Wall Scoring: All segments are normal. Left Atrium: The left atrium is normal in  size. Right Ventricle: The right ventricle is normal in size. There is normal right ventricular global systolic function. Right Atrium: The right atrium is normal in size. Aortic Valve: The aortic valve appears structurally normal. There is no evidence of aortic valve stenosis. The aortic valve dimensionless index is 0.85. There is no evidence of aortic valve regurgitation. The peak instantaneous gradient of the aortic valve is 7 mmHg. The mean gradient of the aortic valve is 4 mmHg. Mitral Valve: The mitral valve is normal in structure. There is mild thickening and calcification of the anterior and posterior mitral valve leaflets. There is no evidence of mitral valve stenosis. There is normal mitral valve leaflet mobility. There is no evidence of mitral valve regurgitation. Tricuspid Valve: The tricuspid valve is structurally normal. There is normal tricuspid valve leaflet mobility. There is mild tricuspid regurgitation. Pulmonic Valve: The pulmonic valve is structurally normal. There is trace pulmonic valve regurgitation. Pericardium: No pericardial effusion noted. Aorta: The aortic root is normal. Pulmonary Artery: The main pulmonary artery is normal in size, and position, with normal bifurcation into the left and right pulmonary arteries. The tricuspid regurgitant velocity is 2.99 m/s, and with an estimated right atrial pressure of 3 mmHg, the estimated pulmonary artery pressure is mild to moderately elevated with the RVSP at 38.8 mmHg. Systemic Veins: The inferior vena cava appears normal in size. In comparison to the previous echocardiogram(s): There are no prior studies on this patient for comparison purposes.  CONCLUSIONS:  1. Left ventricular ejection fraction is normal, by visual estimate at 60%.  2. There is normal right ventricular global systolic function.  3. There is no evidence of mitral valve stenosis.  4. No evidence of mitral valve regurgitation.  5. Aortic valve stenosis is not present.  6. Mild  to moderately elevated pulmonary artery pressure.  7. The main pulmonary artery is normal in size, and position, with normal bifurcation into the left and right pulmonary arteries. RECOMMENDATIONS: Technically suboptimal and limited study, therefore accuracy of above interpretation could be substantially diminished. Clinical correlation is advised. Consider additional imaging modalities if clinically indicated.  QUANTITATIVE DATA SUMMARY:  2D MEASUREMENTS:           Normal Ranges: Ao Root d:       2.50 cm   (2.0-3.7cm) LAs:             2.50 cm   (2.7-4.0cm) IVSd:            0.70 cm   (0.6-1.1cm) LVPWd:           0.70 cm   (0.6-1.1cm) LVIDd:           3.20 cm   (3.9-5.9cm) LVIDs:           2.30 cm LV Mass Index:   37.4 g/m2 LV % FS          28.1 %  LA VOLUME:                    Normal Ranges: LA Vol A4C:        20.8 ml    (22+/-6mL/m2) LA Vol A2C:        19.8 ml LA Vol BP:         20.6 ml LA Vol Index A4C:  14.3ml/m2 LA Vol Index A2C:  13.6 ml/m2 LA Vol Index BP:   14.1 ml/m2 LA Area A4C:       10.4 cm2 LA Area A2C:       10.3 cm2 LA Major Axis A4C: 4.4 cm LA Major Axis A2C: 4.6 cm LA Volume Index:   13.3 ml/m2  RA VOLUME BY A/L METHOD:            Normal Ranges: RA Vol A4C:              22.6 ml    (8.3-19.5ml) RA Vol Index A4C:        15.5 ml/m2 RA Area A4C:             10.5 cm2 RA Major Axis A4C:       4.2 cm  AORTA MEASUREMENTS:         Normal Ranges: Asc Ao, d:          2.30 cm (2.1-3.4cm)  LV SYSTOLIC FUNCTION BY 2D PLANIMETRY (MOD):                      Normal Ranges: EF-A4C View:    61 % (>=55%) EF-A2C View:    58 % EF-Biplane:     58 % EF-Visual:      60 % LV EF Reported: 60 %  LV DIASTOLIC FUNCTION:             Normal Ranges: MV Peak E:             0.74 m/s    (0.7-1.2 m/s) MV Peak A:             0.94 m/s    (0.42-0.7 m/s) E/A Ratio:             0.79        (1.0-2.2) MV e'                  0.097 m/s   (>8.0) MV lateral e'          0.10 m/s MV medial e'           0.09 m/s E/e' Ratio:            7.57         (<8.0) PulmV Sys Shar:         48.40 cm/s PulmV Tucker Shar:        40.30 cm/s PulmV S/D Shar:         1.20 PulmV A Revs Shar:      38.60 cm/s PulmV A Revs Dur:      137.00 msec  MITRAL VALVE:          Normal Ranges: MV DT:        262 msec (150-240msec)  AORTIC VALVE:                     Normal Ranges: AoV Vmax:                1.34 m/s (<=1.7m/s) AoV Peak P.2 mmHg (<20mmHg) AoV Mean P.0 mmHg (1.7-11.5mmHg) LVOT Max Shar:            1.02 m/s (<=1.1m/s) AoV VTI:                 27.40 cm (18-25cm) LVOT VTI:                23.30 cm LVOT Diameter:           1.90 cm  (1.8-2.4cm) AoV Area, VTI:           2.41 cm2 (2.5-5.5cm2) AoV Area,Vmax:           2.16 cm2 (2.5-4.5cm2) AoV Dimensionless Index: 0.85  RIGHT VENTRICLE: RV Basal 3.00 cm RV Mid   2.60 cm RV Major 6.2 cm TAPSE:   16.1 mm RV s'    0.13 m/s  TRICUSPID VALVE/RVSP:          Normal Ranges: Peak TR Velocity:     2.99 m/s RV Syst Pressure:     39 mmHg  (< 30mmHg) IVC Diam:             0.70 cm  PULMONIC VALVE:          Normal Ranges: PV Accel Time:  50 msec  (>120ms) PV Max Shar:     1.0 m/s  (0.6-0.9m/s) PV Max PG:      3.8 mmHg  Pulmonary Veins: PulmV A Revs Dur: 137.00 msec PulmV A Revs Shar: 38.60 cm/s PulmV Tucker Shar:   40.30 cm/s PulmV S/D Shar:    1.20 PulmV Sys Shar:    48.40 cm/s  [id number] Bienvenido Elizabeth DO Electronically signed on 2024 at 9:36:37 AM  Wall Scoring  ** Final **     XR hip left with pelvis when performed 2 or 3 views    Addendum Date: 2024    Interpreted By:  Lucy Wilburn, ADDENDUM: Study: Pelvis one view Left hip, two views.   Signed by: Lucy Wilburn 2024 7:38 PM   -------- ORIGINAL REPORT -------- Dictation workstation:   [id number]    Result Date: 2024  Interpreted By:  Lucy Wilburn, STUDY: Left hip, two views   INDICATION: Signs/Symptoms:fall, left hip pain.   COMPARISON: None.   ACCESSION NUMBER(S): [id number]   ORDERING CLINICIAN: BIENVENIDO NARAYANAN   FINDINGS: There is a nondisplaced  mildly impacted subcapital left femoral neck fracture with varus angulation of the femoral shaft. Soft tissues are within normal limits. Left hip joint space is maintained.       1. Subcapital left femoral neck fracture.   MACRO: None.   Signed by: Lucy Wilburn 11/8/2024 6:44 PM Dictation workstation:   [id number]    CT hip left wo IV contrast    Result Date: 11/8/2024  Interpreted By:  Modesto Mak, STUDY: CT HIP LEFT WO IV CONTRAST; CT 3D RECONSTRUCTION;  11/8/2024 6:54 pm   INDICATION: Signs/Symptoms:hip fx; Signs/Symptoms:trauma.     COMPARISON: Same day radiographs   ACCESSION NUMBER(S): [id number]; [id number]   ORDERING CLINICIAN: HUEY NARAYANAN   TECHNIQUE: CT imaging of the left hip was obtained without contrast. Coronal and sagittal reformatted images were performed. 3D reconstructed images were created on an independent workstation and reviewed.   FINDINGS: OSSEOUS STRUCTURES:   There is redemonstration of the displaced, comminuted and slightly impacted left subcapital femoral fracture, with anterior angulation of the fracture fragments. There is a small associated joint effusion with mild edema present in the adjacent musculature.   No other acute bony abnormalities are present in the partially visualized pelvis or sacrum.   SOFT TISSUES:   Mild fat stranding is present in the cutaneous tissues of the posterior left hip without evidence of fluid collection.   Numerous diverticula are partially visualized in the sigmoid colon without evidence of acute abnormality within the partially visualized abdomen and pelvis.       Displaced, comminuted and slightly impacted left subcapital femoral fracture with anterior angulation of the fracture fragments.   MACRO: None.   Signed by: Modesto Mak 11/8/2024 7:16 PM Dictation workstation:   [id number]    CT 3D reconstruction    Result Date: 11/8/2024  Interpreted By:  Modesto Mak, STUDY: CT HIP LEFT WO IV CONTRAST; CT 3D  RECONSTRUCTION;  11/8/2024 6:54 pm   INDICATION: Signs/Symptoms:hip fx; Signs/Symptoms:trauma.     COMPARISON: Same day radiographs   ACCESSION NUMBER(S): [id number]; [id number]   ORDERING CLINICIAN: HUEY NARAYANAN   TECHNIQUE: CT imaging of the left hip was obtained without contrast. Coronal and sagittal reformatted images were performed. 3D reconstructed images were created on an independent workstation and reviewed.   FINDINGS: OSSEOUS STRUCTURES:   There is redemonstration of the displaced, comminuted and slightly impacted left subcapital femoral fracture, with anterior angulation of the fracture fragments. There is a small associated joint effusion with mild edema present in the adjacent musculature.   No other acute bony abnormalities are present in the partially visualized pelvis or sacrum.   SOFT TISSUES:   Mild fat stranding is present in the cutaneous tissues of the posterior left hip without evidence of fluid collection.   Numerous diverticula are partially visualized in the sigmoid colon without evidence of acute abnormality within the partially visualized abdomen and pelvis.       Displaced, comminuted and slightly impacted left subcapital femoral fracture with anterior angulation of the fracture fragments.   MACRO: None.   Signed by: Modesto Mak 11/8/2024 7:16 PM Dictation workstation:   [id number]    CT head wo IV contrast    Result Date: 11/8/2024  Interpreted By:  Modesto Mak, STUDY: CT HEAD WO IV CONTRAST; CT CERVICAL SPINE WO IV CONTRAST;  11/8/2024 6:53 pm   INDICATION: Signs/Symptoms:syncope, fall; Signs/Symptoms:fall.     COMPARISON: CT head dated 02/03/2012.   ACCESSION NUMBER(S): [id number]; [id number]   ORDERING CLINICIAN: HUEY NARAYANAN   TECHNIQUE: Noncontrast axial CT scan of head was performed, with coronal and sagittal reformats provided. The images were reviewed in bone, brain, blood and soft tissue windows.   Axial CT images of the cervical spine are  obtained. Axial, coronal and sagittal reconstructions are provided for review.   FINDINGS: CT HEAD:   No hyperdense intracranial hemorrhage is present. There is no mass effect or midline shift.   Gray-white differentiation is intact, without evidence of CT apparent transcortical infarct. Subtle attenuation changes present in the periventricular and subcortical white matter of bilateral cerebral hemispheres are nonspecific, but favored to represent sequela of microvascular disease.   No abnormal ventricular dilatation is present. Basal cisterns are patent. No extra-axial fluid collection is identified.   Scalp soft tissues do not demonstrate any acute abnormality. No skull fracture or other acute calvarial abnormality is present.   Mastoid air cells and middle ear cavities are clear without evidence of acute abnormality. Visualized paranasal sinuses are unremarkable in appearance.   CT C-SPINE:   Cervical vertebral alignment is maintained, without significant spondylolisthesis.   Cervical vertebral body heights are preserved, without evidence of compression fractures. Posterior elements of the cervical spine do not demonstrate any evidence of acute trauma.   Craniocervical junction is intact. Facet joints are preserved without evidence of subluxation or perching.   No significant intervertebral disc height loss is present. Several large anterior disc osteophytes are present at the level of C3-C4, C4-C5, C5-C6 and to lesser extent C6-C7.   No high-grade spinal canal stenosis is present. Small disc bulge is evident at the level of C5-C6, with some effacement of the anterior subarachnoid space and mild associated spinal canal narrowing in conjunction with ligamentum flavum thickening.   No significant bony neural foraminal stenosis is identified.   Prevertebral and paraspinal soft tissues are unremarkable in appearance.       CT HEAD:   No evidence of hemorrhage, skull fracture, or other acute intracranial  trauma/abnormality.   CT C-SPINE:   No evidence of acute trauma to the cervical spine.   MACRO: None   Signed by: Modesto Mak 11/8/2024 7:12 PM Dictation workstation:   [id number]    CT cervical spine wo IV contrast    Result Date: 11/8/2024  Interpreted By:  Modesto Mak, STUDY: CT HEAD WO IV CONTRAST; CT CERVICAL SPINE WO IV CONTRAST;  11/8/2024 6:53 pm   INDICATION: Signs/Symptoms:syncope, fall; Signs/Symptoms:fall.     COMPARISON: CT head dated 02/03/2012.   ACCESSION NUMBER(S): [id number]; [id number]   ORDERING CLINICIAN: HUEY NARAYANAN   TECHNIQUE: Noncontrast axial CT scan of head was performed, with coronal and sagittal reformats provided. The images were reviewed in bone, brain, blood and soft tissue windows.   Axial CT images of the cervical spine are obtained. Axial, coronal and sagittal reconstructions are provided for review.   FINDINGS: CT HEAD:   No hyperdense intracranial hemorrhage is present. There is no mass effect or midline shift.   Gray-white differentiation is intact, without evidence of CT apparent transcortical infarct. Subtle attenuation changes present in the periventricular and subcortical white matter of bilateral cerebral hemispheres are nonspecific, but favored to represent sequela of microvascular disease.   No abnormal ventricular dilatation is present. Basal cisterns are patent. No extra-axial fluid collection is identified.   Scalp soft tissues do not demonstrate any acute abnormality. No skull fracture or other acute calvarial abnormality is present.   Mastoid air cells and middle ear cavities are clear without evidence of acute abnormality. Visualized paranasal sinuses are unremarkable in appearance.   CT C-SPINE:   Cervical vertebral alignment is maintained, without significant spondylolisthesis.   Cervical vertebral body heights are preserved, without evidence of compression fractures. Posterior elements of the cervical spine do not demonstrate any  evidence of acute trauma.   Craniocervical junction is intact. Facet joints are preserved without evidence of subluxation or perching.   No significant intervertebral disc height loss is present. Several large anterior disc osteophytes are present at the level of C3-C4, C4-C5, C5-C6 and to lesser extent C6-C7.   No high-grade spinal canal stenosis is present. Small disc bulge is evident at the level of C5-C6, with some effacement of the anterior subarachnoid space and mild associated spinal canal narrowing in conjunction with ligamentum flavum thickening.   No significant bony neural foraminal stenosis is identified.   Prevertebral and paraspinal soft tissues are unremarkable in appearance.       CT HEAD:   No evidence of hemorrhage, skull fracture, or other acute intracranial trauma/abnormality.   CT C-SPINE:   No evidence of acute trauma to the cervical spine.   MACRO: None   Signed by: Modesto Mak 11/8/2024 7:12 PM Dictation workstation:   [id number]    XR chest 1 view    Result Date: 11/8/2024  Interpreted By:  Lucy Wilburn, STUDY: Chest, single AP view.   INDICATION: Signs/Symptoms:fall.   COMPARISON: 06/15/2015   ACCESSION NUMBER(S): [id number]   ORDERING CLINICIAN: HUEY NARAYANAN   FINDINGS: The cardiac silhouette size is within normal limits. There is no focal consolidation, edema or pneumothorax. No sizeable pleural effusion. No acute osseous abnormality.       1. No acute cardiopulmonary process.   MACRO: None.   Signed by: Lucy Wilburn 11/8/2024 6:45 PM Dictation workstation:   [id number]       Procedure  [ none]    Assessment  Status post left hip hemiarthroplasty    Treatment plan  1.  Continue with her physical therapy continue work on range of motion strengthening follow-up with me in 1 month sooner because increased pain or discomfort.  2. [   ]  3. [   ]  4.  All of the patient's questions were answered.    Orders Placed This Encounter    XR hip left with pelvis when  performed 2 or 3 views       This note was prepared using voice recognition software.  The details of this note are correct and have been reviewed, and corrected to the best of my ability.  Some grammatical areas may persist related to the Dragon software    Finesse Porter MD  Senior Attending Physician  [facility]  [facility]th[facility] [place]    [phone]

## 2024-12-24 ENCOUNTER — PATIENT OUTREACH (OUTPATIENT)
Dept: CARE COORDINATION | Facility: CLINIC | Age: 79
End: 2024-12-24
Payer: MEDICARE

## 2024-12-24 NOTE — PROGRESS NOTES
Outreach call to patient to check in 30 days after hospital discharge to support smooth transition of care.  Patient did not answer the phone and recording states, she does not have voicemail set up yet. No additional outreach needed at this time. CM will close case as unable to reach.    Ruthie Mariscal RN/CM  [facility]  [phone]

## 2025-01-07 ENCOUNTER — OFFICE VISIT (OUTPATIENT)
Dept: ORTHOPEDIC SURGERY | Facility: CLINIC | Age: 80
End: 2025-01-07
Payer: MEDICARE

## 2025-01-07 ENCOUNTER — APPOINTMENT (OUTPATIENT)
Dept: ORTHOPEDIC SURGERY | Facility: CLINIC | Age: 80
End: 2025-01-07
Payer: MEDICARE

## 2025-01-07 DIAGNOSIS — Z47.1 AFTERCARE FOLLOWING RIGHT HIP JOINT REPLACEMENT SURGERY: ICD-10-CM

## 2025-01-07 DIAGNOSIS — Z96.642 AFTERCARE FOLLOWING LEFT HIP JOINT REPLACEMENT SURGERY: Primary | ICD-10-CM

## 2025-01-07 DIAGNOSIS — Z47.1 AFTERCARE FOLLOWING LEFT HIP JOINT REPLACEMENT SURGERY: Primary | ICD-10-CM

## 2025-01-07 DIAGNOSIS — Z96.641 AFTERCARE FOLLOWING RIGHT HIP JOINT REPLACEMENT SURGERY: ICD-10-CM

## 2025-01-07 PROCEDURE — 99211 OFF/OP EST MAY X REQ PHY/QHP: CPT | Performed by: ORTHOPAEDIC SURGERY

## 2025-01-07 NOTE — PROGRESS NOTES
Subjective    Patient ID: Marita    Chief Complaint:   Chief Complaint   Patient presents with    Left Hip - Post-op Visit     YULISSA 11/9/24, 3 weeks out, with x-rays today     History of present illness    79-year-old female presented clinic today for 2-month postop visit status post left hip hemiarthroplasty.  Overall doing very well.  Ambulating with the assistance of a cane.  She has now completed in-home physical therapy but has continued her home exercise program on her own.  She has minimal discomfort characterized mostly as tightness over the anterior lateral aspect left hip.  No numbness tingling reported.      Past medical , Surgical, Family and social history reviewed.      Physical exam  General: No acute distress and breathing comfortably.  Patient is pleasant and cooperative with the examination.    Extremity  Left hip is neurovascular intact.  Demonstrates good range of motion.  No sign of infection.  Incision is well-healed without abnormality.  No calf swelling or tenderness.  Compartments soft.  Improving strength left lower extremity.  No DVT.    Diagnostics  [ none]  No results found.     Procedure  [ none]    Assessment  Status post left hip hemiarthroplasty    Treatment plan  1.  She was encouraged to continue with weightbearing activity as tolerated.  She was reminded of the hip precautions.  2.  She will continue with her home exercise program.  3.  Follow-up with us in 1 month with new x-rays left hip at that time possible final check  4.  All of the patient's questions were answered.    No orders of the defined types were placed in this encounter.      This note was prepared using voice recognition software.  The details of this note are correct and have been reviewed, and corrected to the best of my ability.  Some grammatical areas may persist related to the Dragon software    Vikas Pablo PA-C, [facility]  [facility]  Orthopedic [place]    [phone]

## 2025-01-10 ENCOUNTER — APPOINTMENT (OUTPATIENT)
Dept: ORTHOPEDIC SURGERY | Facility: CLINIC | Age: 80
End: 2025-01-10
Payer: MEDICARE

## 2025-02-11 ENCOUNTER — OFFICE VISIT (OUTPATIENT)
Dept: ORTHOPEDIC SURGERY | Facility: CLINIC | Age: 80
End: 2025-02-11
Payer: MEDICARE

## 2025-02-11 ENCOUNTER — HOSPITAL ENCOUNTER (OUTPATIENT)
Dept: RADIOLOGY | Facility: CLINIC | Age: 80
Discharge: HOME | End: 2025-02-11
Payer: MEDICARE

## 2025-02-11 VITALS — WEIGHT: 105 LBS | HEIGHT: 62 IN | BODY MASS INDEX: 19.32 KG/M2

## 2025-02-11 DIAGNOSIS — M25.552 LEFT HIP PAIN: ICD-10-CM

## 2025-02-11 DIAGNOSIS — M25.552 LEFT HIP PAIN: Primary | ICD-10-CM

## 2025-02-11 PROCEDURE — 1160F RVW MEDS BY RX/DR IN RCRD: CPT | Performed by: ORTHOPAEDIC SURGERY

## 2025-02-11 PROCEDURE — 99213 OFFICE O/P EST LOW 20 MIN: CPT | Performed by: ORTHOPAEDIC SURGERY

## 2025-02-11 PROCEDURE — 1159F MED LIST DOCD IN RCRD: CPT | Performed by: ORTHOPAEDIC SURGERY

## 2025-02-11 PROCEDURE — 1036F TOBACCO NON-USER: CPT | Performed by: ORTHOPAEDIC SURGERY

## 2025-02-11 PROCEDURE — 73502 X-RAY EXAM HIP UNI 2-3 VIEWS: CPT | Mod: LT

## 2025-02-11 NOTE — PROGRESS NOTES
"                    History of present illness    79-year-old female very hard of hearing presents for follow-up of her left hip hemiarthroplasty from November 9 states she doing pretty well only having \"some pain\" she is ambulating without assist device she done with her physical therapy.  Denies any fevers or chills.      Past medical , Surgical, Family and social history reviewed.      Physical exam  General: No acute distress and breathing comfortably.  Patient is pleasant and cooperative with the examination.    Extremity  Incision is well-approximated without infection good range of motion no pain with internal ex rotation neurologically tact distally brisk cap refill compartments soft calf is nontender    Diagnostics      XR hip left with pelvis when performed 2 or 3 views    Result Date: 2/11/2025  Interpreted By:  Abe Porter, STUDY: XR HIP LEFT WITH PELVIS WHEN PERFORMED 2 OR 3 VIEWS; 2/11/2025 11:31 am   INDICATION: Signs/Symptoms:pain.   ACCESSION NUMBER(S): [id number]   ORDERING CLINICIAN: ABE PORTER   FINDINGS: Left hip AP and lateral views. Status post cemented hemiarthroplasty components in good position no signs of fracture dislocation or other bony abnormality     Signed by: Abe Porter 2/11/2025 11:53 AM Dictation workstation:   [id number]       Procedure  [ none]    Assessment  Status post left hip hemiarthroplasty    Treatment plan  1.  Continue with her exercise program continue to weight-bear as tolerated follow-up with me if she has increased pain or discomfort otherwise as needed.  2. [   ]  3. [   ]  4.  All of the patient's questions were answered.    Orders Placed This Encounter    XR hip left with pelvis when performed 2 or 3 views       This note was prepared using voice recognition software.  The details of this note are correct and have been reviewed, and corrected to the best of my ability.  Some grammatical areas may persist related to the Dragon " software    Finesse Porter MD  Senior Attending Physician  [facility]  [facility] [place]    [phone]

## 2025-05-14 ENCOUNTER — HOSPITAL ENCOUNTER (OUTPATIENT)
Dept: RADIOLOGY | Facility: HOSPITAL | Age: 80
Discharge: HOME | End: 2025-05-14
Payer: MEDICARE

## 2025-05-14 ENCOUNTER — OFFICE VISIT (OUTPATIENT)
Dept: ORTHOPEDIC SURGERY | Facility: CLINIC | Age: 80
End: 2025-05-14
Payer: MEDICARE

## 2025-05-14 ENCOUNTER — HOSPITAL ENCOUNTER (OUTPATIENT)
Dept: RADIOLOGY | Facility: CLINIC | Age: 80
Discharge: HOME | End: 2025-05-14
Payer: MEDICARE

## 2025-05-14 DIAGNOSIS — M25.561 RIGHT KNEE PAIN, UNSPECIFIED CHRONICITY: ICD-10-CM

## 2025-05-14 DIAGNOSIS — Z78.0 POST-MENOPAUSE: ICD-10-CM

## 2025-05-14 DIAGNOSIS — M17.11 PRIMARY OSTEOARTHRITIS OF RIGHT KNEE: Primary | ICD-10-CM

## 2025-05-14 DIAGNOSIS — Z12.31 VISIT FOR SCREENING MAMMOGRAM: ICD-10-CM

## 2025-05-14 PROCEDURE — 73564 X-RAY EXAM KNEE 4 OR MORE: CPT | Mod: RIGHT SIDE | Performed by: ORTHOPAEDIC SURGERY

## 2025-05-14 PROCEDURE — 77067 SCR MAMMO BI INCL CAD: CPT | Performed by: RADIOLOGY

## 2025-05-14 PROCEDURE — 77080 DXA BONE DENSITY AXIAL: CPT

## 2025-05-14 PROCEDURE — 73564 X-RAY EXAM KNEE 4 OR MORE: CPT | Mod: RT

## 2025-05-14 PROCEDURE — 77063 BREAST TOMOSYNTHESIS BI: CPT | Performed by: RADIOLOGY

## 2025-05-14 PROCEDURE — 2500000004 HC RX 250 GENERAL PHARMACY W/ HCPCS (ALT 636 FOR OP/ED): Performed by: ORTHOPAEDIC SURGERY

## 2025-05-14 PROCEDURE — 77067 SCR MAMMO BI INCL CAD: CPT

## 2025-05-14 PROCEDURE — 99212 OFFICE O/P EST SF 10 MIN: CPT | Mod: 25 | Performed by: ORTHOPAEDIC SURGERY

## 2025-05-14 PROCEDURE — 99214 OFFICE O/P EST MOD 30 MIN: CPT | Performed by: ORTHOPAEDIC SURGERY

## 2025-05-14 PROCEDURE — 20610 DRAIN/INJ JOINT/BURSA W/O US: CPT | Mod: RT | Performed by: ORTHOPAEDIC SURGERY

## 2025-05-14 RX ADMIN — LIDOCAINE HYDROCHLORIDE 4 ML: 10 INJECTION, SOLUTION INFILTRATION; PERINEURAL at 13:46

## 2025-05-14 RX ADMIN — BETAMETHASONE ACETATE AND BETAMETHASONE SODIUM PHOSPHATE 2 ML: 3; 3 INJECTION, SUSPENSION INTRA-ARTICULAR; INTRALESIONAL; INTRAMUSCULAR; SOFT TISSUE at 13:46

## 2025-05-14 NOTE — PROGRESS NOTES
History of present illness    79-year-old female here with a new problem of right-sided knee pain she had a left hip hemiarthroplasty and ever since the hip surgery she been having increasing pain in the right knee no specific injury.      Past medical , Surgical, Family and social history reviewed.      Physical exam  General: No acute distress and breathing comfortably.  Patient is pleasant and cooperative with the examination.    Extremity  The affected knee was examined and inspected and was tender to touch along the medial aspect.  The patient has catching/locking and occasional mechanical symptoms.  The skin was intact without breakdown or open wounds.  There was a mild Yamel exam seen with mild evidence of instability and weakness in the collateral ligaments with laxity to varus valgus stress and in the anterior posterior plane.  There was a negative Lachman's test, pivot shift test, and posterior drawer sign.  There was no foot drop, numbness or tingling.  Sensation, reflexes, and pulses in the foot and ankle were present.  There was an effusion but range of motion was good and straight leg raise testing was normal.   The patient had the ability to bear weight but with discomfort.  The patient's gait was antalgic secondary to the discomfort. Knee range of motion was 0-115    Diagnostics      Imaging  No results found.    Cardiology, Vascular, and Other Imaging  No other imaging results found for the past 7 days       Procedure  Inj/Asp: Right knee on 5/14/2025 1:46 PM  Indications: pain and diagnostic evaluation  Details: 22 G needle, anteromedial approach  Medications: 4 mL lidocaine 10 mg/mL (1 %); 2 mL betamethasone acet,sod phos 6 mg/mL  Outcome: tolerated well, no immediate complications  Procedure, treatment alternatives, risks and benefits explained, specific risks discussed. Consent was given by the patient. Immediately prior to procedure a time out was called to verify the  correct patient, procedure, equipment, support staff and site/side marked as required. Patient was prepped and draped in the usual sterile fashion.             Assessment  Right knee arthritis    Treatment plan  1.  The natural history of the condition and its associated treatment alternatives including surgical and nonsurgical options were discussed with the patient at length.  2.  Right knee cortisone injection performed today with her consent without complication.  Patient understands the cortisone may provide temporary relief how much it helps her how long it lasts is unpredictable.  Cortisone can be repeated after 3 months if symptoms return.  3. [   ]  4.  All of the patient's questions were answered.    Orders Placed This Encounter    XR knee right 4+ views       This note was prepared using voice recognition software.  The details of this note are correct and have been reviewed, and corrected to the best of my ability.  Some grammatical areas may persist related to the Dragon software    Finesse Porter MD  Senior Attending Physician  [facility]  [facility] [place]    [phone]

## 2025-05-16 RX ORDER — LIDOCAINE HYDROCHLORIDE 10 MG/ML
4 INJECTION, SOLUTION INFILTRATION; PERINEURAL
Status: COMPLETED | OUTPATIENT
Start: 2025-05-14 | End: 2025-05-14

## 2025-05-16 RX ORDER — BETAMETHASONE SODIUM PHOSPHATE AND BETAMETHASONE ACETATE 3; 3 MG/ML; MG/ML
2 INJECTION, SUSPENSION INTRA-ARTICULAR; INTRALESIONAL; INTRAMUSCULAR; SOFT TISSUE
Status: COMPLETED | OUTPATIENT
Start: 2025-05-14 | End: 2025-05-14

## 2025-07-30 ENCOUNTER — HOSPITAL ENCOUNTER (OUTPATIENT)
Dept: NEUROLOGY | Facility: HOSPITAL | Age: 80
Discharge: HOME | End: 2025-07-30
Payer: MEDICARE

## 2025-07-30 DIAGNOSIS — G31.84 MILD COGNITIVE IMPAIRMENT OF UNCERTAIN OR UNKNOWN ETIOLOGY: ICD-10-CM

## 2025-07-30 PROCEDURE — 95816 EEG AWAKE AND DROWSY: CPT

## 2025-08-08 ENCOUNTER — HOSPITAL ENCOUNTER (OUTPATIENT)
Dept: RADIOLOGY | Facility: HOSPITAL | Age: 80
Discharge: HOME | End: 2025-08-08
Payer: MEDICARE

## 2025-08-08 DIAGNOSIS — I63.89 OTHER CEREBRAL INFARCTION: ICD-10-CM

## 2025-08-08 DIAGNOSIS — R94.6 ABNORMAL RESULTS OF THYROID FUNCTION STUDIES: ICD-10-CM

## 2025-08-08 DIAGNOSIS — E53.8 DEFICIENCY OF OTHER SPECIFIED B GROUP VITAMINS: ICD-10-CM

## 2025-08-08 DIAGNOSIS — E55.9 VITAMIN D DEFICIENCY, UNSPECIFIED: ICD-10-CM

## 2025-08-08 DIAGNOSIS — G31.84 MILD COGNITIVE IMPAIRMENT OF UNCERTAIN OR UNKNOWN ETIOLOGY: ICD-10-CM

## 2025-08-08 PROCEDURE — 70551 MRI BRAIN STEM W/O DYE: CPT

## 2025-08-19 PROBLEM — M81.0 AGE-RELATED OSTEOPOROSIS WITHOUT CURRENT PATHOLOGICAL FRACTURE: Status: ACTIVE | Noted: 2025-08-19

## (undated) DEVICE — SUTURE, VICRYL, 1, 36 IN, V-34, VIOLET

## (undated) DEVICE — SUTURE, MONOCRYL, 3-0, PS-1 27IN, UNDYED

## (undated) DEVICE — PREP KIT, BONE, BIO-PREP

## (undated) DEVICE — Device

## (undated) DEVICE — GOWN, SURGICAL, ROYAL SILK, LG, STERILE

## (undated) DEVICE — DRAPE, INCISE, ANTIMICROBIAL, IOBAN 2, LARGE, 17 X 23 IN, DISPOSABLE, STERILE

## (undated) DEVICE — CEMENT MIX KIT, REVOLUTION, W BREAKAWAY-HIP

## (undated) DEVICE — STRAP, ARM BOARD, 32 X 1.5

## (undated) DEVICE — RETRIEVER, SUTURE

## (undated) DEVICE — GOWN, SURGICAL, ROYAL SILK, XL, STERILE

## (undated) DEVICE — PREP, IODOPHOR, W/ALCOHOL, DURAPREP, W/APPLICATOR, 26 CC

## (undated) DEVICE — STRAP, VELCRO, BODY, 4 X 60IN, NS

## (undated) DEVICE — WOUND SYSTEM, DEBRIDEMENT & CLEANING, O.R DUOPAK

## (undated) DEVICE — SUTURE, ETHIBOND, P2, V-37, 30 IN, GREEN

## (undated) DEVICE — MANIFOLD, 4 PORT NEPTUNE STANDARD

## (undated) DEVICE — GLOVE, SURGICAL, PROTEXIS PI , 7.5, PF, LF

## (undated) DEVICE — SUTURE, MONOCRYL, 3-0, 36 IN, CT-1, UNDYED

## (undated) DEVICE — BATH BLANKET STERILE

## (undated) DEVICE — HOOD, SURGICAL, FLYTE, T7

## (undated) DEVICE — IRRIGATION SYSTEM, BRUSH, W/SUCTION, FEMORAL CANAL

## (undated) DEVICE — CAUTERY, PENCIL, PUSH BUTTON, SMOKE EVAC, 70MM

## (undated) DEVICE — PROTECTOR, NERVE, ULNAR, PINK

## (undated) DEVICE — ADHESIVE, SKIN, DERMABOND ADVANCED, 15CM, PEN-STYLE

## (undated) DEVICE — POSITIONER, CRADLE, HEAD, MEDC, FOAM SLOTTED

## (undated) DEVICE — BLADE, SAW, RECIPROCATING, SHORT

## (undated) DEVICE — DRESSING, MEPILEX BORDER, POST-OP AG, 4 X 10 IN

## (undated) DEVICE — PILLOW, ABDUCTION, LARGE, 25 X 18 X 6.25 IN